# Patient Record
Sex: MALE | Race: WHITE | NOT HISPANIC OR LATINO | Employment: FULL TIME | URBAN - NONMETROPOLITAN AREA
[De-identification: names, ages, dates, MRNs, and addresses within clinical notes are randomized per-mention and may not be internally consistent; named-entity substitution may affect disease eponyms.]

---

## 2018-08-02 ENCOUNTER — OFFICE VISIT CONVERTED (OUTPATIENT)
Dept: FAMILY MEDICINE CLINIC | Age: 38
End: 2018-08-02
Attending: NURSE PRACTITIONER

## 2020-07-10 ENCOUNTER — CONVERSION ENCOUNTER (OUTPATIENT)
Dept: FAMILY MEDICINE CLINIC | Age: 40
End: 2020-07-10

## 2020-07-10 ENCOUNTER — HOSPITAL ENCOUNTER (OUTPATIENT)
Dept: OTHER | Facility: HOSPITAL | Age: 40
Discharge: HOME | End: 2020-07-10
Attending: NURSE PRACTITIONER

## 2020-07-14 LAB — SARS-COV-2 RNA SPEC QL NAA+PROBE: NOT DETECTED

## 2020-11-11 ENCOUNTER — HOSPITAL ENCOUNTER (OUTPATIENT)
Dept: OTHER | Facility: HOSPITAL | Age: 40
Discharge: HOME | End: 2020-11-11
Attending: FAMILY MEDICINE

## 2020-11-14 LAB — SARS-COV-2 RNA SPEC QL NAA+PROBE: NOT DETECTED

## 2021-01-20 ENCOUNTER — OFFICE VISIT CONVERTED (OUTPATIENT)
Dept: FAMILY MEDICINE CLINIC | Age: 41
End: 2021-01-20
Attending: NURSE PRACTITIONER

## 2021-03-09 ENCOUNTER — HOSPITAL ENCOUNTER (OUTPATIENT)
Dept: OTHER | Facility: HOSPITAL | Age: 41
Discharge: HOME | End: 2021-03-09
Attending: SURGERY

## 2021-03-09 ENCOUNTER — OFFICE VISIT CONVERTED (OUTPATIENT)
Dept: SURGERY | Facility: CLINIC | Age: 41
End: 2021-03-09
Attending: SURGERY

## 2021-03-10 LAB — SARS-COV-2 RNA SPEC QL NAA+PROBE: NOT DETECTED

## 2021-03-12 ENCOUNTER — HOSPITAL ENCOUNTER (OUTPATIENT)
Dept: PERIOP | Facility: HOSPITAL | Age: 41
Setting detail: HOSPITAL OUTPATIENT SURGERY
Discharge: HOME | End: 2021-03-12
Attending: SURGERY

## 2021-03-23 ENCOUNTER — OFFICE VISIT CONVERTED (OUTPATIENT)
Dept: SURGERY | Facility: CLINIC | Age: 41
End: 2021-03-23
Attending: SURGERY

## 2021-05-10 NOTE — H&P
History and Physical      Patient Name: David Coles   Patient ID: [id number]   Sex: Male   YOB: 1980    Primary Care Provider: Bailey IVORY   Referring Provider: Bailey IVORY    Visit Date: March 9, 2021    Provider: Tomas Earl MD   Location: [facility] [place]   Location Address: [address] [place], [place] [address]  [address] [place] [address]  [id number]   Location Phone: [phone]          Chief Complaint  · Hemorrhoids      History Of Present Illness  David Coles is a 40 year old /White male who presents to the office today as a consult from Bailey IVORY.      History of tobacco abuse, hypertension    He has a 6-month history of worsening pain to his anus with occasional razor blade type pain with bowel movements as well as sitting on his anus.  Occasional painless hematochezia on the toilet paper.  No significant spasm after a bowel movement.  No prolapse.  No thrombosed hemorrhoid.  No relief with Preparation H.  No weight loss.  No family history of colorectal cancer.  No previous colonoscopy.  No change in bowel habits.  No fiber or laxative use.  No purulent drainage from anus.  No history of abscess or fistula.       Past Medical History  Disease Name Date Onset Notes   Hemorrhoids --  --    HTN (hypertension) --  --          Past Surgical History  Procedure Name Date Notes   *Denies any surgical procedures --  --    *No Past Surgical History --  --          Medication List  Name Date Started Instructions   lisinopril 10 mg oral tablet  take 1 tablet (10 mg) by oral route once daily         Allergy List  Allergen Name Date Reaction Notes   NO KNOWN DRUG ALLERGIES --  --  --          Family Medical History  Disease Name Relative/Age Notes   *No Known Family History  --          Social History  Finding Status Start/Stop Quantity Notes   Tobacco Current every day --/-- 1 pack --          Review of Systems  · Constitutional  o Denies  o :  chills, fever  · Eyes  o Denies  o : yellowish discoloration of eyes  · HENT  o Denies  o : nose bleeding, difficulty swallowing  · Cardiovascular  o Denies  o : chest pain on exertion  · Respiratory  o Denies  o : shortness of breath  · Gastrointestinal  o Denies  o : nausea, constipation  · Genitourinary  o Denies  o : abnormal color of urine, frequency of urine  · Integument  o Denies  o : rash, skin lesion or lump  · Neurologic  o Denies  o : tingling or numbness  · Musculoskeletal  o Denies  o : joint pain  · Endocrine  o Denies  o : weight gain, weight loss  · Psychiatric  o Denies  o : anxiety, delusions      Vitals  Date Time BP Position Site L\R Cuff Size HR RR TEMP (F) WT  HT  BMI kg/m2 BSA m2 O2 Sat FR L/min FiO2 HC       03/09/2021 01:39 PM       16 97.5 201lbs 8oz 6'   27.33 2.15             Physical Examination  · Constitutional  o Appearance  o : healthy appearing, alert and in no acute distress, reliable historian  · Head and Face  o Head  o :   § Inspection  § : no visable deformities or lesions  · Eyes  o Conjunctivae  o : clear  o Sclerae  o : clear  · Neck  o Inspection/Palpation  o : normal appearance, no masses, trachea midline  · Respiratory  o Respiratory Effort  o : breathing unlabored, respiratory effort appears normal  o Inspection of Chest  o : normal appearance, no retractions  · Cardiovascular  o Heart  o : regular rate and rhythm  · Gastrointestinal  o Abdominal Examination  o :   § Abdomen  § : soft, nontender, nondistended  · Genitourinary  o Anus  o : No thrombosed hemorrhoid. External os left posterior with some tracking seen. Not consistent with fissure. Rectal exam not performed secondary to discomfort.  · Skin and Subcutaneous Tissue  o General Inspection  o : no visible concerning rashes or lesions present  · Neurologic  o Cranial Nerves  o : no obvious motor deficits  o Sensation  o : no obvious sensory deficits  o Gait and Station  o :   § Gait Screening  § : normal gait,  able to stand without diffculty  o Cerebellar Function  o : no obvious abnormalities  · Psychiatric  o Judgement and Insight  o : judgment and insight intact  o Mood and Affect  o : mood normal, affect appropriate          Assessment  · Pre-Surgical Orders     V72.84  · Anal fistula     565.1/K60.3      Plan  · Orders  o GENERAL SURGERY (GNSUR) - V72.84 - 03/09/2021  o [facility] Pre-Op Covid-19 Screening (38749) - - 03/09/2021  · Medications  o Medications have been Reconciled  o Transition of Care or Provider Policy  · Instructions  o PLEASE SIGN PERMIT FOR: Exam under anesthesia with any indicated procedures  o Consult Anesthesia for any post-operative block, or any pain management procedure deemed necessary by the anesthesiologist for adequate post-operative pain control.  o Anesthesia: General, prone, order my pain cocktail, no Trimble, no enema, no bowel prep  o Outpatient  o O.R. PREP: Per protocol  o IV: Per Anesthesia  o SCD's preoperatively  o *__Cefazolin 2 gram IV on call to OR.  o Consent for surgery: Given these options, the patient has verbally expressed an understanding of the risks of surgery and finds these risks acceptable. We will proceed with surgery as soon as possible.  o The indications, options, risks, benefits, and expected outcomes of the planned procedure were discussed with the patient and the patient agrees to proceed.   o Electronically Identified Patient Education Materials Provided Electronically     I had a discussion with the patient.  I explained what I saw on physical exam and that he may have a fistula causing the pain.  I recommended exam under anesthesia with any indicated procedures including possible fistulotomy, partial lateral internal anal sphincterotomy, seton placement.  Benefits and alternatives discussed.  Risk of procedure including risk of anesthesia, bleeding, infection, need for more surgery, change in continence, pain.  All questions answered.  He agrees with the  plan.    Smoking cessation performed.             Electronically Signed by: Tomas Earl MD -Author on March 9, 2021 01:59:45 PM

## 2021-05-10 NOTE — H&P
History and Physical      Patient Name: David Coles   Patient ID: [id number]   Sex: Male   YOB: 1980    Primary Care Provider: Bailey IVORY   Referring Provider: Bailey IVORY    Visit Date: March 23, 2021    Provider: Tomas Earl MD   Location: [facility] [place]   Location Address: [address] [place] [address] [facility]  [address] [place] [address]  [id number]   Location Phone: [phone]          Chief Complaint  · Outpatient History & Physical / Surgical Orders  · Colon Consult  · Follow up Surgery      History Of Present Illness  David Coles is a 40 year old /White male who presents to the office today as a consult from Bailey IVORY.      History of anal fissure status post partial lateral internal anal sphincterotomy    He comes in for follow-up.  He is doing well.  His pain is resolved from the fissure.  He is pleased with his progress.  He is using fiber.       Past Medical History  Disease Name Date Onset Notes   Hemorrhoids --  --    HTN (hypertension) --  --          Past Surgical History  Procedure Name Date Notes   *Denies any surgical procedures --  --    *No Past Surgical History --  --          Medication List  Name Date Started Instructions   lisinopril 10 mg oral tablet  take 1 tablet (10 mg) by oral route once daily         Allergy List  Allergen Name Date Reaction Notes   NO KNOWN DRUG ALLERGIES --  --  --          Family Medical History  Disease Name Relative/Age Notes   *No Known Family History  --          Social History  Finding Status Start/Stop Quantity Notes   Tobacco Current every day --/-- 1 pack --          Review of Systems  · Constitutional  o Denies  o : chills, fever  · Gastrointestinal  o Denies  o : nausea, vomiting      Vitals  Date Time BP Position Site L\R Cuff Size HR RR TEMP (F) WT  HT  BMI kg/m2 BSA m2 O2 Sat FR L/min FiO2 HC       03/23/2021 12:32 PM       16 97.4 203lbs 6oz 6'   27.58 2.16             Physical  Examination  · Constitutional  o Appearance  o : healthy appearing, alert and in no acute distress, reliable historian  · Head and Face  o Head  o :   § Inspection  § : no visable deformities or lesions  · Eyes  o Conjunctivae  o : clear  o Sclerae  o : clear  · Neck  o Inspection/Palpation  o : normal appearance, no masses, trachea midline  · Respiratory  o Respiratory Effort  o : breathing unlabored, respiratory effort appears normal  o Inspection of Chest  o : normal appearance, no retractions  · Cardiovascular  o Heart  o : regular rate and rhythm  · Gastrointestinal  o Abdominal Examination  o :   § Abdomen  § : soft, nondistended  · Skin and Subcutaneous Tissue  o General Inspection  o : no visible concerning rashes or lesions present  · Neurologic  o Cranial Nerves  o : no obvious motor deficits  o Sensation  o : no obvious sensory deficits  o Gait and Station  o :   § Gait Screening  § : normal gait, able to stand without diffculty  o Cerebellar Function  o : no obvious abnormalities  · Psychiatric  o Judgement and Insight  o : judgment and insight intact  o Mood and Affect  o : mood normal, affect appropriate          Assessment  · Pre-Surgical Orders     V72.84  · Rectal Bleeding     569.3/K62.5      Plan  · Orders  o Colonoscopy (73165) - V72.84 - 06/24/2021  · Medications  o Medications have been Reconciled  o Transition of Care or Provider Policy  · Instructions  o PLAN: Colonoscopy  o Outpatient  o Consent for surgery: Given these options, the patient has verbally expressed an understanding of the risks of surgery and finds these risks acceptable. We will proceed with surgery as soon as possible.  o The indications, options, risks, benefits, and expected outcomes of the planned procedure were discussed with the patient and the patient agrees to proceed.   o IV: LR@ 30 ml/hr  o Anesthesia: MAC  o PLEASE SIGN PERMIT FOR: Colonscopy with possible biopsy and possible polypectomy, possible rubber band  ligation of internal hemorrhoids  o Electronically Identified Patient Education Materials Provided Electronically     I instructed him to continue fiber.  I recommended colonoscopy with possible rubber band ligation in 3 months due to his history of rectal bleeding.  Benefits and alternatives discussed.  Risk of procedure including bleeding, perforation, pain, infection were discussed.  All questions answered.  He agrees with the plan.  Thank you for this consult.             Electronically Signed by: Tomas Earl MD -Author on March 23, 2021 12:43:26 PM

## 2021-05-14 VITALS — HEIGHT: 72 IN | BODY MASS INDEX: 27.29 KG/M2 | WEIGHT: 201.5 LBS | RESPIRATION RATE: 16 BRPM | TEMPERATURE: 97.5 F

## 2021-05-14 VITALS — WEIGHT: 203.37 LBS | RESPIRATION RATE: 16 BRPM | TEMPERATURE: 97.4 F | BODY MASS INDEX: 27.55 KG/M2 | HEIGHT: 72 IN

## 2021-05-18 NOTE — PROGRESS NOTES
Sanket David R. 1980     Office/Outpatient Visit    Visit Date: Thu, Aug 2, 2018 09:52 am    Provider: Bailey Ricardo N.P. (Assistant: Sharmila Del Toro)    Location: [facility]        Electronically signed by Bailey Ricardo N.P. on  08/02/2018 10:32:51 AM                             SUBJECTIVE:        CC:     Mr. Coles is a 37 year old White male.  stomach pain x2 days, blood in stool         HPI:         Mr. Coles presents with hypertension.  His current cardiac medication regimen includes an ACE inhibitor ( Lisinopril ).  Mr. Coles does not check his blood pressure other than at his clinic appointments.  Compliance with treatment has been poor; he Has not taken BP medications for about 6 months..          Additionally, he presents with history of abdominal pain, other specified site.  this is located primarily in the epigastric region.  It began 2 months ago.  The pain is relieved with Tums.  Associated symptoms include red bloody stools.  He denies constipation, diarrhea, fever, nausea or vomiting.      ROS:     CONSTITUTIONAL:  Negative for chills and fever.      CARDIOVASCULAR:  Negative for chest pain and palpitations.      RESPIRATORY:  Negative for dyspnea and frequent wheezing.      GASTROINTESTINAL:  Positive for abdominal pain and bright red blood in stool yesterday.   Negative for constipation, diarrhea, nausea or vomiting.          PMH/FMH/SH:     Last Reviewed on 8/02/2018 10:10 AM by Bailey Ricardo    Past Medical History:             PAST MEDICAL HISTORY         Positive for    Hypertension: uncontrolled; ;         Surgical History:     NONE         Family History:     Father: Healthy     Mother: Healthy         Social History:     Occupation: [employer]     Marital Status:      Children: 1 child         Tobacco/Alcohol/Supplements:     Last Reviewed on 8/02/2018 10:10 AM by Bailey Ricardo    Tobacco: Current Smoker: He currently smokes every day, 1 pack per day; 1-1/2 packs per  day; (started smoking at age 16 pack-year history).          Alcohol: When he drinks, the average quantity of alcohol is frequency weekly.   He typically consumes beer.  drinks beer some during the week         Substance Abuse History:     Last Reviewed on 8/02/2018 10:10 AM by Bailey Ricardo        Mental Health History:     Last Reviewed on 8/02/2018 10:10 AM by Bailey Ricardo        Communicable Diseases (eg STDs):     Last Reviewed on 8/02/2018 10:10 AM by Bailey Ricardo            Current Problems:     Last Reviewed on 8/02/2018 10:10 AM by Bailey Ricardo    Hypertension     High blood pressure     Cigarette smoking         Immunizations:     None        Allergies:     Last Reviewed on 8/02/2018 10:10 AM by Bailey Ricardo      No Known Drug Allergies.         Current Medications:     Last Reviewed on 8/02/2018 10:10 AM by Bailey Ricardo    Lisinopril 10mg Tablet Take 1 tablet(s) by mouth daily         OBJECTIVE:        Vitals:         Current: 8/2/2018 9:54:54 AM    Ht:  6 ft, 0 in;  Wt: 194.8 lbs;  BMI: 26.4    T: 97.4 F (oral);  BP: 150/94 mm Hg (left arm, sitting);  P: 65 bpm (left arm (BP Cuff), sitting);  sCr: 1.04 mg/dL;  GFR: 98.82        Repeat:     10:01:36 AM     BP:   136/92mm Hg (left arm, sitting)         Exams:     PHYSICAL EXAM:     GENERAL: well developed, well nourished;  no apparent distress;     RESPIRATORY: normal respiratory rate and pattern with no distress; normal breath sounds with no rales, rhonchi, wheezes or rubs;     CARDIOVASCULAR: normal rate; rhythm is regular;     GASTROINTESTINAL: nontender; normal bowel sounds; no organomegaly; rectal exam: normal tone; external hemorrhoid(s) present;     MUSCULOSKELETAL: normal gait;     NEUROLOGIC: mental status: alert and oriented x 3; GROSSLY INTACT     PSYCHIATRIC: appropriate affect and demeanor;         ASSESSMENT           401.1   I10  Hypertension              DDx:     789.09   R10.33  Abdominal pain, other specified site              DDx:      455.6   K64.9  Hemorrhoids, NOS              DDx:     305.1   F17.200  Cigarette smoking              DDx:         ORDERS:         Meds Prescribed:       Refill of: Lisinopril 10mg Tablet Take 1 tablet(s) by mouth daily  #90 (Ninety) tablet(s) Refills: 1       Analpram-HC (Hydrocortisone Acetate/Pramoxine ) 2.5%/1% Rectal Cream Apply twice per day, prn  #30 (Thirty) gm Refills: 0         Lab Orders:       28494  AMYS - [facility] Amylase, Serum  (Send-Out)         98108  BDCB2 - [facility] CBC w/o diff  (Send-Out)         65348  COMP - [facility] Comp. Metabolic Panel  (Send-Out)         59699  HPUBT - [facility] H.pylori Breath test  (Send-Out)         95570  LIP - [facility] Lipase, Serum  (Send-Out)         80081  LPDP - [facility] Lipid Panel  (Send-Out)         APPTO  Appointment need  (In-House)           Other Orders:       4004F  Pt scrnd tobacco use rcvd tobacco cessation talk  (In-House)                   PLAN:          Hypertension     LABORATORY:  Labs ordered to be performed today include lipid panel.      RECOMMENDATIONS given include: keep blood pressure log as directed and eat a low salt diet.      FOLLOW-UP: Schedule a follow-up visit in 6 months.            Prescriptions:       Refill of: Lisinopril 10mg Tablet Take 1 tablet(s) by mouth daily  #90 (Ninety) tablet(s) Refills: 1           Orders:       95515  LPDP - [facility] Lipid Panel  (Send-Out)         APPTO  Appointment need  (In-House)            Abdominal pain, other specified site     LABORATORY:  Labs ordered to be performed today include amylase, CBC W/O DIFF, Comprehensive metabolic panel, H. pylori breath test, and Lipase.      RECOMMENDATIONS given include: Further recommendation to be given after test results are complete.      FOLLOW-UP:.   for after labs           Orders:       38240  AMYS - [facility] Amylase, Serum  (Send-Out)         70366  BDCB2 - [facility] CBC w/o diff  (Send-Out)         96381  COMP - [facility] Comp. Metabolic Panel  (Send-Out)         07147  HPUBT - [facility] H.pylori Breath test   (Send-Out)         24438  LIP - [facility] Lipase, Serum  (Send-Out)            Hemorrhoids, NOS Increase water intake and fiber intake. To ER for worsening symptoms such as black tarry stools, weakness, or vomiting blood.           Prescriptions:       Analpram-HC (Hydrocortisone Acetate/Pramoxine ) 2.5%/1% Rectal Cream Apply twice per day, prn  #30 (Thirty) gm Refills: 0          Cigarette smoking         RECOMMENDATIONS given include: Counseled on smoking cessation and advised of the benefits to patient's health if he were to stop smoking. Counseling for less than 3 minutes.            Orders:       4004F  Pt scrnd tobacco use rcvd tobacco cessation talk  (In-House)               Patient Recommendations:        For  Hypertension:     Keep a daily blood pressure log and report elevated blood pressure to provider as directed.  Schedule a follow-up visit in 6 months.          For  Abdominal pain, other specified site:                     APPOINTMENT INFORMATION:        Monday Tuesday Wednesday Thursday Friday Saturday Sunday            Time:___________________AM  PM   Date:_____________________         For  Cigarette smoking:         Stop smoking.              CHARGE CAPTURE           **Please note: ICD descriptions below are intended for billing purposes only and may not represent clinical diagnoses**        Primary Diagnosis:         401.1 Hypertension            I10    Essential (primary) hypertension              Orders:          27182   Office/outpatient visit; established patient, level 4  (In-House)             APPTO   Appointment need  (In-House)           789.09 Abdominal pain, other specified site            R10.33    Periumbilical pain    455.6 Hemorrhoids, NOS            K64.9    Unspecified hemorrhoids    305.1 Cigarette smoking            F17.200    Nicotine dependence, unspecified, uncomplicated              Orders:          4004F   Pt scrnd tobacco use rcvd tobacco cessation talk  (In-House)

## 2021-05-18 NOTE — PROGRESS NOTES
David Coles  1980     Office/Outpatient Visit    Visit Date: Wed, Jan 20, 2021 01:59 pm    Provider: Bailey Ricardo N.P. (Assistant: Leticia Jacques RN)    Location: [facility]        Electronically signed by Bailey Ricardo N.P. on  01/20/2021 02:40:53 PM                             Subjective:        CC: Chris is a 40 year old White male.  Follow up (has not taking lisinopril in a year)         HPI:           Patient to be evaluated for essential (primary) hypertension.  He is not currently taking an antihypertensive.  Was previously on Lisinopril 10 mg daily. Has not taken for at least one year.  He is willing to restart. Denies CP, SOA, palpitations.      David also would like to be evaluated for hemorrhoids today. He has history of hemorrhoids. For the past few days he has felt a knot in rectal area and had pain. Some rectal bleeding but not as bad as with previous hemorrhoids. Using preparation H. Has been working overtime doing heavy lifting until the past few days so thinks that may have contributed. Previously used Analpram which helped. Has never had hemorroidectomy.    ROS:     CONSTITUTIONAL:  Negative for chills and fever.      CARDIOVASCULAR:  Negative for chest pain and palpitations.      RESPIRATORY:  Negative for dyspnea and frequent wheezing.      GASTROINTESTINAL:  Positive for hemorrhoids.   Negative for abdominal pain or vomiting.      NEUROLOGICAL:  Negative for dizziness and headaches.      PSYCHIATRIC:  Negative for depression and suicidal thoughts.          Past Medical History / Family History / Social History:         Last Reviewed on 1/20/2021 02:17 PM by Bailey Ricardo    Past Medical History:             PAST MEDICAL HISTORY         Positive for    Hypertension: uncontrolled; ;         Surgical History:     NONE         Family History:     Father: Healthy     Mother: Healthy         Social History:     Occupation: [employer]     Marital Status:       Children: 1 child         Tobacco/Alcohol/Supplements:     Last Reviewed on 1/20/2021 02:00 PM by Leticia Jacques    Tobacco: Current Smoker: He currently smokes every day, 1 pack per day; 1-1/2 packs per day; (started smoking at age 16 pack-year history).          Alcohol: Frequency: Once a week When he drinks, the average quantity of alcohol is 1-2 drinks.   He typically consumes beer.          Substance Abuse History:     Last Reviewed on 8/02/2018 10:10 AM by Bailey Ricardo        Mental Health History:     Last Reviewed on 8/02/2018 10:10 AM by Bailey Ricardo        Communicable Diseases (eg STDs):     Last Reviewed on 8/02/2018 10:10 AM by Bailey Ricardo        Current Problems:     Last Reviewed on 8/02/2018 10:10 AM by Bailey Ricardo    Nicotine dependence, unspecified, uncomplicated    Essential (primary) hypertension    Unspecified hemorrhoids    Contact with and (suspected) exposure to other viral communicable diseases        Immunizations:     None        Allergies:     Last Reviewed on 1/20/2021 02:00 PM by Leticia Jacques    No Known Allergies.        Current Medications:     Last Reviewed on 1/20/2021 02:00 PM by Leticia Jacques    Lisinopril 10mg Tablet [Take 1 tablet(s) by mouth daily]        Objective:        Vitals:         Historical:     8/2/2018  BP:   136/92 mm Hg ( (left arm, , sitting, );) 8/2/2018  P:   65bpm ( (left arm (BP Cuff), , sitting, );) 8/2/2018  Wt:   194.8lbs    Current: 1/20/2021 2:03:43 PM    Ht:  6 ft, 0 in;  Wt: 203.6 lbs;  BMI: 27.6T: 98.5 F (oral);  BP: 147/94 mm Hg (left arm, sitting);  P: 79 bpm (left arm (BP Cuff), sitting);  sCr: 1.11 mg/dL;  GFR: 91.66        Repeat:     2:4:38 PM  BP:   141/92mm Hg (left arm, sitting, pulse-83)     Exams:     PHYSICAL EXAM:     GENERAL: well developed, well nourished;  no apparent distress;     RESPIRATORY: normal respiratory rate and pattern with no distress; normal breath sounds with no rales, rhonchi, wheezes or rubs;     CARDIOVASCULAR:  normal rate; rhythm is regular;     GASTROINTESTINAL: nontender; normal bowel sounds; no organomegaly; rectal exam: normal tone; external hemorrhoid(s) present;     GENITOURINARY: penis: no lesions or urethral discharge;  no testicular tenderness or masses; no inguinal hernia; normal scrotum without lesions or rashes     MUSCULOSKELETAL: normal gait;     NEUROLOGIC: mental status: alert and oriented x 3; GROSSLY INTACT     PSYCHIATRIC: appropriate affect and demeanor; Declines having a chaperone present during exam.          Assessment:         I10   Essential (primary) hypertension       F17.200   Nicotine dependence, unspecified, uncomplicated       K64.9   Unspecified hemorrhoids       Z13.31   Encounter for screening for depression           ORDERS:         Meds Prescribed:       [Refilled] Lisinopril 10 mg oral tablet [Take 1 tablet(s) by mouth daily], #90 (ninety) tablets, Refills: 0 (zero)       [Refilled] Analpram-HC 2.5-1 % Rectal Cream [Apply twice per day, prn], #30 (thirty) grams, Refills: 0 (zero)         Lab Orders:       58109  [facility] Comp. Metabolic Panel  (Send-Out)            30453  [facility] Lipid Panel  (Send-Out)            APPTO  Appointment need  (In-House)              Other Orders:       4004F  Pt scrnd tobacco use rcvd tobacco cessation talk  (In-House)              Depression screen negative  (In-House)                      Plan:         Essential (primary) hypertensionLow sodium diet. Work on alcohol reduction. Will restart Lisinopril. Will return for fasting labs in 6-10 weeks.    LABORATORY:  Labs ordered to be performed today include Comprehensive metabolic panel and lipid panel.      FOLLOW-UP: Schedule a follow-up visit in 3 months.            Prescriptions:       [Refilled] Lisinopril 10 mg oral tablet [Take 1 tablet(s) by mouth daily], #90 (ninety) tablets, Refills: 0 (zero)           Orders:       55392  [facility] Comp. Metabolic Panel  (Send-Out)            56670  [facility] Lipid Panel  (Send-Out)            APPTO  Appointment need  (In-House)              Nicotine dependence, unspecified, uncomplicated        RECOMMENDATIONS given include: Counseled on smoking cessation and advised of the benefits to patient's health if he were to stop smoking. Counseling for less than 3 minutes.            Orders:       4004F  Pt scrnd tobacco use rcvd tobacco cessation talk  (In-House)              Unspecified hemorrhoidsSitz baths. Off work to rest. No heavy lifting. Discussed may need referral to gen surg/GI if no improvement. Will call if no improvement.          Prescriptions:       [Refilled] Analpram-HC 2.5-1 % Rectal Cream [Apply twice per day, prn], #30 (thirty) grams, Refills: 0 (zero)         Encounter for screening for depression    MIPS PHQ-9 Depression Screening: Completed form scanned and in chart; Total Score 9; Positive Depression Screen but after further evaluation the patient does not have a diagnosis of depression.            Orders:         Depression screen negative  (In-House)                  Patient Recommendations:        For  Essential (primary) hypertension:    Schedule a follow-up visit in 3 months.          For  Nicotine dependence, unspecified, uncomplicated:        Stop smoking.              Charge Capture:         Primary Diagnosis:     I10  Essential (primary) hypertension           Orders:      23627  Office/outpatient visit; established patient, level 4  (In-House)            APPTO  Appointment need  (In-House)              F17.200  Nicotine dependence, unspecified, uncomplicated           Orders:      4004F  Pt scrnd tobacco use rcvd tobacco cessation talk  (In-House)              K64.9  Unspecified hemorrhoids     Z13.31  Encounter for screening for depression           Orders:        Depression screen negative  (In-House)

## 2021-06-18 DIAGNOSIS — I10 ESSENTIAL HYPERTENSION: Primary | ICD-10-CM

## 2021-06-18 RX ORDER — LISINOPRIL 10 MG/1
1 TABLET ORAL DAILY
COMMUNITY
End: 2021-06-18 | Stop reason: SDUPTHER

## 2021-06-18 RX ORDER — LISINOPRIL 10 MG/1
10 TABLET ORAL DAILY
Qty: 30 TABLET | Refills: 0 | Status: SHIPPED | OUTPATIENT
Start: 2021-06-18 | End: 2022-05-31 | Stop reason: SDDI

## 2021-06-18 NOTE — TELEPHONE ENCOUNTER
Caller: David Coles    Relationship: Self    Best call back number: [phone]     Medication needed: LISINIPRIL    When do you need the refill by: 06/18/21    What additional details did the patient provide when requesting the medication:     Does the patient have less than a 3 day supply:  [x] Yes  [] No    What is the patient's preferred pharmacy: [facility] [address] [place] [address], [place] [address]  MARIE SULTANA  - [phone] [facility] [phone] FX

## 2021-07-01 VITALS
HEART RATE: 65 BPM | WEIGHT: 194.8 LBS | TEMPERATURE: 97.4 F | BODY MASS INDEX: 26.38 KG/M2 | SYSTOLIC BLOOD PRESSURE: 136 MMHG | HEIGHT: 72 IN | DIASTOLIC BLOOD PRESSURE: 92 MMHG

## 2021-07-02 VITALS
SYSTOLIC BLOOD PRESSURE: 141 MMHG | TEMPERATURE: 98.5 F | DIASTOLIC BLOOD PRESSURE: 92 MMHG | HEART RATE: 79 BPM | BODY MASS INDEX: 27.58 KG/M2 | WEIGHT: 203.6 LBS | HEIGHT: 72 IN

## 2021-07-02 VITALS — HEIGHT: 72 IN | TEMPERATURE: 97.3 F | BODY MASS INDEX: 26.42 KG/M2

## 2022-05-31 ENCOUNTER — OFFICE VISIT (OUTPATIENT)
Dept: FAMILY MEDICINE CLINIC | Age: 42
End: 2022-05-31

## 2022-05-31 ENCOUNTER — LAB (OUTPATIENT)
Dept: LAB | Facility: HOSPITAL | Age: 42
End: 2022-05-31

## 2022-05-31 VITALS
HEART RATE: 85 BPM | TEMPERATURE: 98.8 F | WEIGHT: 210 LBS | DIASTOLIC BLOOD PRESSURE: 89 MMHG | BODY MASS INDEX: 28.48 KG/M2 | SYSTOLIC BLOOD PRESSURE: 147 MMHG

## 2022-05-31 DIAGNOSIS — N39.0 URINARY TRACT INFECTION WITHOUT HEMATURIA, SITE UNSPECIFIED: Primary | ICD-10-CM

## 2022-05-31 DIAGNOSIS — R10.84 GENERALIZED ABDOMINAL PAIN: ICD-10-CM

## 2022-05-31 DIAGNOSIS — I10 ESSENTIAL HYPERTENSION: Primary | ICD-10-CM

## 2022-05-31 DIAGNOSIS — N39.0 URINARY TRACT INFECTION WITHOUT HEMATURIA, SITE UNSPECIFIED: ICD-10-CM

## 2022-05-31 DIAGNOSIS — I10 ESSENTIAL HYPERTENSION: ICD-10-CM

## 2022-05-31 LAB
BACTERIA UR QL AUTO: ABNORMAL /HPF
BASOPHILS # BLD AUTO: 0.02 10*3/MM3 (ref 0–0.2)
BASOPHILS NFR BLD AUTO: 0.3 % (ref 0–1.5)
BILIRUB UR QL STRIP: NEGATIVE
CLARITY UR: CLEAR
COLOR UR: YELLOW
DEPRECATED RDW RBC AUTO: 40.4 FL (ref 37–54)
EOSINOPHIL # BLD AUTO: 0.1 10*3/MM3 (ref 0–0.4)
EOSINOPHIL NFR BLD AUTO: 1.3 % (ref 0.3–6.2)
ERYTHROCYTE [DISTWIDTH] IN BLOOD BY AUTOMATED COUNT: 11.9 % (ref 12.3–15.4)
GLUCOSE UR STRIP-MCNC: NEGATIVE MG/DL
HCT VFR BLD AUTO: 44.3 % (ref 37.5–51)
HGB BLD-MCNC: 14.8 G/DL (ref 13–17.7)
HGB UR QL STRIP.AUTO: ABNORMAL
IMM GRANULOCYTES # BLD AUTO: 0.02 10*3/MM3 (ref 0–0.05)
IMM GRANULOCYTES NFR BLD AUTO: 0.3 % (ref 0–0.5)
KETONES UR QL STRIP: NEGATIVE
LEUKOCYTE ESTERASE UR QL STRIP.AUTO: NEGATIVE
LYMPHOCYTES # BLD AUTO: 2.96 10*3/MM3 (ref 0.7–3.1)
LYMPHOCYTES NFR BLD AUTO: 37 % (ref 19.6–45.3)
MCH RBC QN AUTO: 30.3 PG (ref 26.6–33)
MCHC RBC AUTO-ENTMCNC: 33.4 G/DL (ref 31.5–35.7)
MCV RBC AUTO: 90.8 FL (ref 79–97)
MONOCYTES # BLD AUTO: 0.43 10*3/MM3 (ref 0.1–0.9)
MONOCYTES NFR BLD AUTO: 5.4 % (ref 5–12)
MUCOUS THREADS URNS QL MICRO: ABNORMAL /HPF
NEUTROPHILS NFR BLD AUTO: 4.46 10*3/MM3 (ref 1.7–7)
NEUTROPHILS NFR BLD AUTO: 55.7 % (ref 42.7–76)
NITRITE UR QL STRIP: NEGATIVE
PH UR STRIP.AUTO: 6 [PH] (ref 5–8)
PLATELET # BLD AUTO: 224 10*3/MM3 (ref 140–450)
PMV BLD AUTO: 10.2 FL (ref 6–12)
PROT UR QL STRIP: NEGATIVE
RBC # BLD AUTO: 4.88 10*6/MM3 (ref 4.14–5.8)
RBC # UR STRIP: ABNORMAL /HPF
REF LAB TEST METHOD: ABNORMAL
SP GR UR STRIP: >=1.03 (ref 1–1.03)
SQUAMOUS #/AREA URNS HPF: ABNORMAL /HPF
UROBILINOGEN UR QL STRIP: ABNORMAL
WBC # UR STRIP: ABNORMAL /HPF
WBC NRBC COR # BLD: 7.99 10*3/MM3 (ref 3.4–10.8)

## 2022-05-31 PROCEDURE — 36415 COLL VENOUS BLD VENIPUNCTURE: CPT

## 2022-05-31 PROCEDURE — 99214 OFFICE O/P EST MOD 30 MIN: CPT | Performed by: NURSE PRACTITIONER

## 2022-05-31 PROCEDURE — 80053 COMPREHEN METABOLIC PANEL: CPT

## 2022-05-31 PROCEDURE — 81001 URINALYSIS AUTO W/SCOPE: CPT | Performed by: NURSE PRACTITIONER

## 2022-05-31 PROCEDURE — 82150 ASSAY OF AMYLASE: CPT

## 2022-05-31 PROCEDURE — 87086 URINE CULTURE/COLONY COUNT: CPT | Performed by: NURSE PRACTITIONER

## 2022-05-31 PROCEDURE — 85025 COMPLETE CBC W/AUTO DIFF WBC: CPT

## 2022-05-31 NOTE — PROGRESS NOTES
David Coles presents to [facility].    Chief Complaint:  Flank Pain ((R) about a week )         History of Present Illness:  Side pain  Symptoms started one week ago or so ago  Associated symptoms: palpable area to right abd/side, no change,  occ abd pain   Treatment tried:none   Fell coon hunting, bruise on abd, right side 2-3 weeks ago    PAST MEDICAL HISTORY         Positive for    Hypertension: ran out of rx         Surgical History:     Sphincterotomy: 3- Dr Tomas Earl : posterior midline fissure, no fistula        Family History:     Father: Healthy     Mother: Healthy     Brother: 1 healthy    Sister: 1 healthy    Daughter: 1 healthy      Social History:     Occupation: [employer]     Marital Status:      Children: 1 child           Review of Systems:  Review of Systems   Constitutional: Negative for fatigue and fever.   Respiratory: Negative for cough and shortness of breath.    Cardiovascular: Negative for chest pain, palpitations and leg swelling.   Gastrointestinal: Positive for blood in stool (occ ). Negative for constipation and diarrhea.   Genitourinary: Negative for dysuria.   Neurological: Negative for numbness.        No current outpatient medications on file.    Vital Signs:   Vitals:    05/31/22 1043   BP: 147/89   BP Location: Left arm   Patient Position: Sitting   Pulse: 85   Temp: 98.8 °F (37.1 °C)   TempSrc: Oral   Weight: 95.3 kg (210 lb)         Physical Exam:  Physical Exam  Vitals reviewed.   Constitutional:       General: He is not in acute distress.     Appearance: Normal appearance.   Neck:      Vascular: No carotid bruit.   Cardiovascular:      Rate and Rhythm: Normal rate and regular rhythm.      Heart sounds: Normal heart sounds. No murmur heard.  Pulmonary:      Effort: Pulmonary effort is normal. No respiratory distress.      Breath sounds: Normal breath sounds.   Abdominal:      Tenderness: There is no abdominal tenderness.  There is no right CVA tenderness or left CVA tenderness.   Musculoskeletal:      Right lower leg: No edema.      Left lower leg: No edema.   Skin:     Comments: Superficial discrete tiny palpable area to right mid abd   Neurological:      Mental Status: He is alert.   Psychiatric:         Behavior: Behavior normal.      Comments: Anxious          Result Review      The following data was reviewed by: CACHORRO Hanks on 05/31/2022:    Results for orders placed or performed in visit on 05/31/22   Urinalysis With Culture If Indicated - Urine, Clean Catch    Specimen: Urine, Clean Catch   Result Value Ref Range    Color, UA Yellow Yellow, Straw    Appearance, UA Clear Clear    pH, UA 6.0 5.0 - 8.0    Specific Gravity, UA >=1.030 1.005 - 1.030    Glucose, UA Negative Negative    Ketones, UA Negative Negative    Bilirubin, UA Negative Negative    Blood, UA Trace (A) Negative    Protein, UA Negative Negative    Leuk Esterase, UA Negative Negative    Nitrite, UA Negative Negative    Urobilinogen, UA 0.2 E.U./dL 0.2 - 1.0 E.U./dL   Urinalysis, Microscopic Only - Urine, Clean Catch    Specimen: Urine, Clean Catch   Result Value Ref Range    RBC, UA 0-2 (A) None Seen /HPF    WBC, UA 3-5 (A) None Seen /HPF    Bacteria, UA None Seen None Seen /HPF    Squamous Epithelial Cells, UA 0-2 None Seen, 0-2 /HPF    Mucus, UA Trace None Seen, Trace /HPF    Methodology Manual Light Microscopy                Assessment and Plan:          Diagnoses and all orders for this visit:    1. Essential hypertension (Primary)  Assessment & Plan:  Have his bp rechecked, checking labs     Orders:  -     Comprehensive metabolic panel; Future    2. Generalized abdominal pain  Assessment & Plan:  Checking labs, reviewed chart, reviewed op note from Dr Earl for anal surgery in 3-2021     Orders:  -     Amylase; Future  -     CBC w AUTO Differential; Future  -     Comprehensive metabolic panel; Future  -     Urinalysis With Culture If Indicated  - Urine, Clean Catch  -     Urinalysis, Microscopic Only - Urine, Clean Catch        Follow Up   Return for followup pending lab results.  Patient was given instructions and counseling regarding his condition or for health maintenance advice. Please see specific information pulled into the AVS if appropriate.

## 2022-06-01 LAB
ALBUMIN SERPL-MCNC: 4.4 G/DL (ref 3.5–5.2)
ALBUMIN/GLOB SERPL: 1.4 G/DL
ALP SERPL-CCNC: 67 U/L (ref 39–117)
ALT SERPL W P-5'-P-CCNC: 30 U/L (ref 1–41)
AMYLASE SERPL-CCNC: 34 U/L (ref 28–100)
ANION GAP SERPL CALCULATED.3IONS-SCNC: 13.7 MMOL/L (ref 5–15)
AST SERPL-CCNC: 23 U/L (ref 1–40)
BACTERIA SPEC AEROBE CULT: NO GROWTH
BILIRUB SERPL-MCNC: 0.6 MG/DL (ref 0–1.2)
BUN SERPL-MCNC: 13 MG/DL (ref 6–20)
BUN/CREAT SERPL: 11.2 (ref 7–25)
CALCIUM SPEC-SCNC: 9.7 MG/DL (ref 8.6–10.5)
CHLORIDE SERPL-SCNC: 101 MMOL/L (ref 98–107)
CO2 SERPL-SCNC: 25.3 MMOL/L (ref 22–29)
CREAT SERPL-MCNC: 1.16 MG/DL (ref 0.76–1.27)
EGFRCR SERPLBLD CKD-EPI 2021: 81.1 ML/MIN/1.73
GLOBULIN UR ELPH-MCNC: 3.1 GM/DL
GLUCOSE SERPL-MCNC: 88 MG/DL (ref 65–99)
POTASSIUM SERPL-SCNC: 4.2 MMOL/L (ref 3.5–5.2)
PROT SERPL-MCNC: 7.5 G/DL (ref 6–8.5)
SODIUM SERPL-SCNC: 140 MMOL/L (ref 136–145)

## 2022-06-02 ENCOUNTER — TELEPHONE (OUTPATIENT)
Dept: FAMILY MEDICINE CLINIC | Age: 42
End: 2022-06-02

## 2022-06-02 NOTE — TELEPHONE ENCOUNTER
Caller: David Coles    Relationship: Self    Best call back number: [phone]    Who are you requesting to speak with (clinical staff, provider,  specific staff member): KITA    What was the call regarding: PATIENT MISSED A CALL FROM KITA YESTERDAY. ATTEMPTED TO WARM TRANSFER. PLEASE CALL PATIENT BACK WHEN POSSIBLE.

## 2024-04-16 ENCOUNTER — READMISSION MANAGEMENT (OUTPATIENT)
Dept: CALL CENTER | Facility: HOSPITAL | Age: 44
End: 2024-04-16
Payer: COMMERCIAL

## 2024-04-16 ENCOUNTER — TELEPHONE (OUTPATIENT)
Dept: FAMILY MEDICINE CLINIC | Age: 44
End: 2024-04-16
Payer: COMMERCIAL

## 2024-04-16 NOTE — OUTREACH NOTE
Prep Survey      Flowsheet Row Responses   [facility] facility patient discharged from? Non-BH   Is LACE score < 7 ? Non-BH Discharge   Eligibility [facility]   Date of Discharge 04/16/24   Discharge Disposition Home or Self Care   Discharge diagnosis Type I or II open fracture of distal end of left forearm, initial encounter (Primary Dx),  Type I or II open displaced transverse fracture of shaft of left radius, initial encounter,  Type I or II open displaced transverse fracture of shaft of left ulna, initial encounter   Does the patient have one of the following disease processes/diagnoses(primary or secondary)? Other   Prep survey completed? Yes            Calista SINGH - Registered Nurse

## 2024-04-16 NOTE — TELEPHONE ENCOUNTER
Caller: JAMES    Relationship to patient: [facility]    Best call back number: [phone]    New or established patient?  [] New  [x] Established    Date of discharge: 04/16/24    Facility discharged from: [facility]    BROKEN LEFT ARM

## 2024-04-17 ENCOUNTER — TRANSITIONAL CARE MANAGEMENT TELEPHONE ENCOUNTER (OUTPATIENT)
Dept: CALL CENTER | Facility: HOSPITAL | Age: 44
End: 2024-04-17
Payer: COMMERCIAL

## 2024-04-17 NOTE — OUTREACH NOTE
Call Center TCM Note      Flowsheet Row Responses   [facility] patient discharged from? Non-BH  [U/L]   Does the patient have one of the following disease processes/diagnoses(primary or secondary)? Other   TCM attempt successful? Yes  [verbal release from 2022 lists Doretha Coles, wife]   Call start time 0836   Call end time 0845   Discharge diagnosis Type I or II open fracture of distal end of left forearm, initial encounter (Primary Dx),  Type I or II open displaced transverse fracture of shaft of left radius, initial encounter,  Type I or II open displaced transverse fracture of shaft of left ulna, initial encounter   Person spoke with today (if not patient) and relationship kathrine Coyne   Meds reviewed with patient/caregiver? Yes   Is the patient having any side effects they believe may be caused by any medication additions or changes? No   Does the patient have all medications ordered at discharge? Yes   Prescription comments Started on hydrocodone, Calcium, vitamin D, sodium, methocarbamol, and gabapentin   Is the patient taking all medications as directed (includes completed medication regime)? Yes   Comments TCM FOLLOW UP APPOINTMENT IS 4/18/24@430pm   Psychosocial issues? No   Did the patient receive a copy of their discharge instructions? Yes   Nursing interventions Reviewed instructions with patient   What is the patient's perception of their health status since discharge? Same  [Wife reports pt has soft wrap to left arm and can be removed tomorrow, she is to clean daily with soap/water, reviewed insicional instructions with wife and s/s infection.  Pt to avoid lifting for 2 months. Reviewed post op care]   Is the patient/caregiver able to teach back signs and symptoms related to disease process for when to call PCP? Yes   Is the patient/caregiver able to teach back signs and symptoms related to disease process for when to call 911? Yes   Additional teach back comments Pt has had a BM,  no bowel  regimen--encouraged fluid intake, fruits/veggies and to monitor bowel habits.  Encouraged use of IS for one week.  Pt had some swelling to elbow--is propping and icing for relief.   TCM call completed? Yes   Call end time [id number]            Shanika Tinoco RN    4/17/2024, 08:52 EDT

## 2024-04-18 ENCOUNTER — OFFICE VISIT (OUTPATIENT)
Dept: FAMILY MEDICINE CLINIC | Age: 44
End: 2024-04-18
Payer: COMMERCIAL

## 2024-04-18 VITALS
TEMPERATURE: 98.7 F | SYSTOLIC BLOOD PRESSURE: 139 MMHG | WEIGHT: 198.8 LBS | OXYGEN SATURATION: 97 % | HEIGHT: 72 IN | BODY MASS INDEX: 26.93 KG/M2 | DIASTOLIC BLOOD PRESSURE: 90 MMHG | HEART RATE: 86 BPM

## 2024-04-18 DIAGNOSIS — S52.92XB: Primary | ICD-10-CM

## 2024-04-18 DIAGNOSIS — I10 ESSENTIAL HYPERTENSION: ICD-10-CM

## 2024-04-18 DIAGNOSIS — S52.322B: ICD-10-CM

## 2024-04-18 DIAGNOSIS — S52.222B: ICD-10-CM

## 2024-04-18 RX ORDER — HYDROCODONE BITARTRATE AND ACETAMINOPHEN 5; 325 MG/1; MG/1
1 TABLET ORAL EVERY 6 HOURS PRN
COMMUNITY
Start: 2024-04-16 | End: 2024-04-23

## 2024-04-18 RX ORDER — CALCIUM CARBONATE 500(1250)
500 TABLET ORAL DAILY
COMMUNITY
Start: 2024-04-16

## 2024-04-18 RX ORDER — METHOCARBAMOL 750 MG/1
750 TABLET, FILM COATED ORAL 2 TIMES DAILY
COMMUNITY
Start: 2024-04-16 | End: 2024-04-30

## 2024-04-18 RX ORDER — GABAPENTIN 300 MG/1
300 CAPSULE ORAL 2 TIMES DAILY
COMMUNITY
Start: 2024-04-16 | End: 2024-04-30

## 2024-04-18 NOTE — PROGRESS NOTES
Transitional Care Follow Up Visit  Subjective     David PEACOCK Sanket is a 43 y.o. male who presents for a transitional care management visit.    Within 48 business hours after discharge our office contacted him via telephone to coordinate his care and needs.      I reviewed and discussed the details of that call along with the discharge summary, hospital problems, inpatient lab results, inpatient diagnostic studies, and consultation reports with David.     Current outpatient and discharge medications have been reconciled for the patient.  Reviewed by: CACHORRO Alfonso          4/16/2024     1:14 PM   Date of TCM Phone Call   [facility]   Date of Discharge 4/16/2024   Discharge Disposition Home or Self Care     Risk for Readmission (LACE) No data recorded    History of Present Illness   Course During Hospital Stay:  David is here today for transition of care appointment following admission to [facility] from 4/14/2024 to 4/16/2024 for diagnosis of Open fracture of distal end of left forearm, open displaced transverse fracture of shaft of left radius, open displaced transverse fracture of shaft of left ulna. On 4/15/24, had LUE BBFA ORIF. Has been using ice. Taking hydrocodone, calcium, vitamin D, and gabapentin as prescribed. Has had BM. Has not been taking stool softener. Has resumed normal diet. Has not been lifting. He has follow up scheduled with ortho on 5/10/24. Can remove dressing today.       Review of Systems      Current Outpatient Medications:     calcium carbonate, oyster shell, 500 MG tablet tablet, Take 1 tablet by mouth Daily., Disp: , Rfl:     gabapentin (NEURONTIN) 300 MG capsule, Take 1 capsule by mouth 2 (Two) Times a Day., Disp: , Rfl:     HYDROcodone-acetaminophen (Norco) 5-325 MG per tablet, Take 1 tablet by mouth Every 6 (Six) Hours As Needed., Disp: , Rfl:     methocarbamol (ROBAXIN) 750 MG tablet, Take 1 tablet by mouth 2 (Two) Times a Day., Disp:  ", Rfl:     Vitamin D3 25 MCG (1000 UT) capsule, Take 1 capsule by mouth Daily., Disp: , Rfl:   There are no discontinued medications.      Objective   Vitals:    04/18/24 1616   BP: 139/90   BP Location: Right arm   Patient Position: Sitting   Cuff Size: Large Adult   Pulse: 86   Temp: 98.7 °F (37.1 °C)   TempSrc: Oral   SpO2: 97%   Weight: 90.2 kg (198 lb 12.8 oz)   Height: 182.9 cm (72\")     Body mass index is 26.96 kg/m².      Physical Exam  Vitals reviewed.   Constitutional:       General: He is not in acute distress.     Appearance: Normal appearance. He is well-developed.   HENT:      Head: Normocephalic and atraumatic.   Cardiovascular:      Rate and Rhythm: Normal rate and regular rhythm.   Pulmonary:      Effort: Pulmonary effort is normal.      Breath sounds: Normal breath sounds.   Skin:     Comments: Dressing removed in clinic today. 2 lines of incision sutures clean and intact. Small skin open area clean and healing. Antibacterial ointment applied. Nonadherent dressing applied. Pulses palpable. Mild amount of swelling noted   Neurological:      Mental Status: He is alert and oriented to person, place, and time.   Psychiatric:         Mood and Affect: Mood and affect normal.         Lab Results   Component Value Date    GLUCOSE 88 05/31/2022    BUN 13 05/31/2022    CREATININE 1.16 05/31/2022    EGFR 81.1 05/31/2022    BCR 11.2 05/31/2022    K 4.2 05/31/2022    CO2 25.3 05/31/2022    CALCIUM 9.7 05/31/2022    ALBUMIN 4.40 05/31/2022    BILITOT 0.6 05/31/2022    AST 23 05/31/2022    ALT 30 05/31/2022       No results found for: \"CHOL\", \"CHLPL\", \"TRIG\", \"HDL\", \"LDL\", \"LDLDIRECT\"    Lab Results   Component Value Date    WBC 7.99 05/31/2022    HGB 14.8 05/31/2022    HCT 44.3 05/31/2022    MCV 90.8 05/31/2022     05/31/2022         Assessment & Plan   Assessment & Plan  Type I or II open fracture of distal end of left forearm, initial encounter    Type I or II open displaced transverse fracture of shaft " of left radius, initial encounter    Type I or II open displaced transverse fracture of shaft of left ulna, initial encounter    He is aware of s/s to monitor, how to clean area, no heavy lifting. Has pain medication and knows how to take. He has follow up scheduled with ortho that he will keep. Can take stool softener if develops constipation.   Essential hypertension    Was previously on BP medication. BP okay in office today and has not taken for a couple of years. He will follow up in one month for physical and BP recheck.

## 2024-04-18 NOTE — ASSESSMENT & PLAN NOTE
Was previously on BP medication. BP okay in office today and has not taken for a couple of years. He will follow up in one month for physical and BP recheck.

## 2024-06-14 ENCOUNTER — OFFICE VISIT (OUTPATIENT)
Dept: FAMILY MEDICINE CLINIC | Age: 44
End: 2024-06-14
Payer: COMMERCIAL

## 2024-06-14 VITALS
WEIGHT: 195.4 LBS | TEMPERATURE: 98.5 F | BODY MASS INDEX: 26.47 KG/M2 | SYSTOLIC BLOOD PRESSURE: 155 MMHG | HEART RATE: 79 BPM | OXYGEN SATURATION: 98 % | DIASTOLIC BLOOD PRESSURE: 95 MMHG | HEIGHT: 72 IN

## 2024-06-14 DIAGNOSIS — T81.49XA INCISIONAL INFECTION: Primary | ICD-10-CM

## 2024-06-14 PROCEDURE — 87070 CULTURE OTHR SPECIMN AEROBIC: CPT | Performed by: PHYSICIAN ASSISTANT

## 2024-06-14 PROCEDURE — 99213 OFFICE O/P EST LOW 20 MIN: CPT | Performed by: PHYSICIAN ASSISTANT

## 2024-06-14 PROCEDURE — 87205 SMEAR GRAM STAIN: CPT | Performed by: PHYSICIAN ASSISTANT

## 2024-06-14 NOTE — PROGRESS NOTES
Subjective     CHIEF COMPLAINT    Chief Complaint   Patient presents with    Post-op Problem     Left forearm drainage from surgery on 4/15. Given Bactrim DS on 5/10 at post op visit to prevent deep infection per [facility] note. Pt reports finishing Abx entirely the week of 5/20. Wound is still draining. Pt states he discussed with RN at [facility] and she reports this is normal but would like to check without provider to be sure.            History of Present Illness  This is a 43-year-old male presenting to the clinic with concern for postop wound infection.  He had ORIF at [facility] on 4/15/2024.  He did have 1 postop wound infection treated with Bactrim.  He is concerned because he continues to have drainage from the wound.  He has spoken to the [facility] phone nurse who states this is normal and expected.  However they have not seen it in person so he wanted someone to lay eyes on it to make sure it was okay.  He is otherwise well with no fever or chills.              Review of Systems   Constitutional:  Negative for chills and fever.   Gastrointestinal:  Negative for nausea and vomiting.   Musculoskeletal:  Negative for arthralgias and myalgias.   Skin:  Positive for wound.            History reviewed. No pertinent past medical history.         History reviewed. No pertinent surgical history.         Family History   Problem Relation Age of Onset    Diabetes Mother     Diabetes Maternal Grandmother             Social History     Socioeconomic History    Marital status:    Tobacco Use    Smoking status: Every Day     Current packs/day: 1.00     Average packs/day: 1 pack/day for 20.0 years (20.0 ttl pk-yrs)     Types: Cigarettes     Passive exposure: Never    Smokeless tobacco: Never   Vaping Use    Vaping status: Never Used            No Known Allergies         Current Outpatient Medications on File Prior to Visit   Medication Sig Dispense Refill    calcium carbonate, oyster shell, 500 MG tablet tablet Take 1 tablet by  "mouth Daily. (Patient not taking: Reported on 6/14/2024)      gabapentin (NEURONTIN) 300 MG capsule Take 1 capsule by mouth 2 (Two) Times a Day.      Vitamin D3 25 MCG (1000 UT) capsule Take 1 capsule by mouth Daily. (Patient not taking: Reported on 6/14/2024)       No current facility-administered medications on file prior to visit.            /95 (BP Location: Right arm, Patient Position: Sitting, Cuff Size: Adult)   Pulse 79   Temp 98.5 °F (36.9 °C) (Oral)   Ht 182.9 cm (72\")   Wt 88.6 kg (195 lb 6.4 oz)   SpO2 98%   BMI 26.50 kg/m²          Objective     Physical Exam  Vitals and nursing note reviewed.   Constitutional:       General: He is not in acute distress.     Appearance: Normal appearance.   Pulmonary:      Effort: Pulmonary effort is normal. No respiratory distress.   Skin:     General: Skin is warm and dry.      Findings: No erythema.      Comments: See image below.    Neurological:      Mental Status: He is alert and oriented to person, place, and time.   Psychiatric:         Mood and Affect: Mood normal.         Behavior: Behavior normal.       Incision with small wound shown below.  Purulent to serosanguineous drainage noted.           Assessment & Plan  Incisional infection  Wound culture obtained from drainage.  Will await results prior to initiating any therapy.  Recommend cleaning with warm water and antibacterial soap daily. Also recommend applying Aquaphor or Vaseline.  Keep covered if actively draining or if performing activities where the wound may become contaminated.  Will plan to forward today's note to surgery team at his request.    Orders Placed This Encounter   Procedures    Wound Culture - Surgical Site, Arm, Left                   FOR FULL DISCHARGE INSTRUCTIONS/COMMENTS/HANDOUTS please see the   AVS      "

## 2024-06-18 LAB
BACTERIA SPEC AEROBE CULT: ABNORMAL
BACTERIA SPEC AEROBE CULT: ABNORMAL
GRAM STN SPEC: ABNORMAL

## 2024-06-19 ENCOUNTER — OFFICE VISIT (OUTPATIENT)
Dept: FAMILY MEDICINE CLINIC | Age: 44
End: 2024-06-19
Payer: COMMERCIAL

## 2024-06-19 VITALS
SYSTOLIC BLOOD PRESSURE: 137 MMHG | BODY MASS INDEX: 26.66 KG/M2 | TEMPERATURE: 98.1 F | OXYGEN SATURATION: 98 % | DIASTOLIC BLOOD PRESSURE: 87 MMHG | WEIGHT: 196.8 LBS | HEIGHT: 72 IN | HEART RATE: 89 BPM

## 2024-06-19 DIAGNOSIS — Z72.0 NICOTINE ABUSE: ICD-10-CM

## 2024-06-19 DIAGNOSIS — I10 ESSENTIAL HYPERTENSION: Primary | ICD-10-CM

## 2024-06-19 PROCEDURE — 99214 OFFICE O/P EST MOD 30 MIN: CPT | Performed by: NURSE PRACTITIONER

## 2024-06-19 RX ORDER — LISINOPRIL 5 MG/1
5 TABLET ORAL DAILY
Qty: 90 TABLET | Refills: 1 | Status: SHIPPED | OUTPATIENT
Start: 2024-06-19

## 2024-06-19 NOTE — ASSESSMENT & PLAN NOTE
Patient has been educated on the risk of diseases from using tobacco products such as cancer, COPD, and heart disease and has been advised to quit. I spent less than 3 minutes counseling the patient.

## 2024-06-19 NOTE — PROGRESS NOTES
"Chief Complaint  David Coles presents to [facility] for Hypertension    Subjective          History of Present Illness    David is here today to follow up on his blood pressure. Was previously on BP medications (Lisinopril 10 mg daily) several years ago. BP has been elevated at recent appointments here on 4/18/24 and 6/14/24. He has not been monitoring at home.   Reports arm has been doing well since his appt on Friday. No more drainage. Has follow up scheduled on 6/28/24 with surgeon.     Review of Systems      No Known Allergies   History reviewed. No pertinent past medical history.  Current Outpatient Medications   Medication Sig Dispense Refill    lisinopril (PRINIVIL,ZESTRIL) 5 MG tablet Take 1 tablet by mouth Daily. 90 tablet 1     No current facility-administered medications for this visit.     History reviewed. No pertinent surgical history.   Social History     Tobacco Use    Smoking status: Every Day     Current packs/day: 1.00     Average packs/day: 1 pack/day for 20.0 years (20.0 ttl pk-yrs)     Types: Cigarettes     Passive exposure: Never    Smokeless tobacco: Never   Vaping Use    Vaping status: Never Used     Family History   Problem Relation Age of Onset    Diabetes Mother     Diabetes Maternal Grandmother      There are no preventive care reminders to display for this patient.     Immunization History   Administered Date(s) Administered    Tdap 04/14/2024        Objective     Vitals:    06/19/24 1254   BP: 137/87   BP Location: Left arm   Patient Position: Sitting   Cuff Size: Large Adult   Pulse: 89   Temp: 98.1 °F (36.7 °C)   TempSrc: Oral   SpO2: 98%   Weight: 89.3 kg (196 lb 12.8 oz)   Height: 182.9 cm (72\")     Body mass index is 26.69 kg/m².                No results found.    Physical Exam  Vitals reviewed.   Constitutional:       General: He is not in acute distress.     Appearance: Normal appearance. He is well-developed.   HENT:      Head: " Normocephalic and atraumatic.   Cardiovascular:      Rate and Rhythm: Normal rate and regular rhythm.   Pulmonary:      Effort: Pulmonary effort is normal.      Breath sounds: Normal breath sounds.   Skin:     Comments: Left forearm incision clean and healing, no drainage noted   Neurological:      Mental Status: He is alert and oriented to person, place, and time.   Psychiatric:         Mood and Affect: Mood and affect normal.           Result Review :                               Assessment and Plan      Assessment & Plan  Essential hypertension    BP elevated. Will start Lisinopril 5 mg daily. BP log. Let me know of concerns.   Nicotine abuse  Patient has been educated on the risk of diseases from using tobacco products such as cancer, COPD, and heart disease and has been advised to quit. I spent less than 3 minutes counseling the patient.       New Medications Ordered This Visit   Medications    lisinopril (PRINIVIL,ZESTRIL) 5 MG tablet     Sig: Take 1 tablet by mouth Daily.     Dispense:  90 tablet     Refill:  1               Follow Up     Return in about 10 weeks (around 8/28/2024) for Annual physical.

## 2024-09-06 ENCOUNTER — OFFICE VISIT (OUTPATIENT)
Dept: FAMILY MEDICINE CLINIC | Age: 44
End: 2024-09-06
Payer: COMMERCIAL

## 2024-09-06 VITALS
HEART RATE: 75 BPM | TEMPERATURE: 98.1 F | DIASTOLIC BLOOD PRESSURE: 81 MMHG | WEIGHT: 204.4 LBS | HEIGHT: 72 IN | OXYGEN SATURATION: 98 % | BODY MASS INDEX: 27.68 KG/M2 | SYSTOLIC BLOOD PRESSURE: 134 MMHG

## 2024-09-06 DIAGNOSIS — I10 ESSENTIAL HYPERTENSION: ICD-10-CM

## 2024-09-06 DIAGNOSIS — Z00.00 ANNUAL PHYSICAL EXAM: Primary | ICD-10-CM

## 2024-09-06 PROBLEM — Z72.0 NICOTINE ABUSE: Status: RESOLVED | Noted: 2024-06-19 | Resolved: 2024-09-06

## 2024-09-06 PROCEDURE — 99396 PREV VISIT EST AGE 40-64: CPT | Performed by: NURSE PRACTITIONER

## 2024-09-06 RX ORDER — LISINOPRIL 5 MG/1
5 TABLET ORAL DAILY
Qty: 90 TABLET | Refills: 1 | Status: SHIPPED | OUTPATIENT
Start: 2024-09-06

## 2024-09-06 NOTE — PROGRESS NOTES
Chief Complaint  David Coles presents to [facility] for Annual Exam    Subjective          History of Present Illness    David is here today for annual preventive exam.   Declines covid, PNA, influenza vaccines.  UTD Tdap vaccine.   Former smoker. Quit June 2024.   He is on Lisinopril 5 mg daily for HTN. He is not checking his blood pressure at home.   He may have to have a repeat surgery on his arm. He has a follow up appt next week.     Review of Systems   Constitutional:  Negative for chills and fever.   HENT:  Negative for ear pain and sore throat.    Eyes:  Negative for blurred vision and redness.   Respiratory:  Negative for shortness of breath and wheezing.    Cardiovascular:  Negative for chest pain and palpitations.   Gastrointestinal:  Negative for abdominal pain and vomiting.   Genitourinary:  Negative for frequency and urgency.   Skin:  Negative for rash.   Neurological:  Negative for seizures and syncope.   Psychiatric/Behavioral:  Negative for suicidal ideas and depressed mood.          No Known Allergies   Past Medical History:   Diagnosis Date    Hypertension      Current Outpatient Medications   Medication Sig Dispense Refill    lisinopril (PRINIVIL,ZESTRIL) 5 MG tablet Take 1 tablet by mouth Daily. 90 tablet 1     No current facility-administered medications for this visit.     Past Surgical History:   Procedure Laterality Date    ORIF FOREARM FRACTURE        Social History     Tobacco Use    Smoking status: Former     Average packs/day: 1 pack/day for 29.0 years (29.0 ttl pk-yrs)     Types: Cigarettes     Start date: 1995     Passive exposure: Never    Smokeless tobacco: Never   Vaping Use    Vaping status: Never Used     Family History   Problem Relation Age of Onset    Diabetes Mother     Diabetes Maternal Grandmother      Health Maintenance Due   Topic Date Due    INFLUENZA VACCINE  08/01/2024      Immunization History   Administered Date(s) Administered     "Td (TDVAX) 05/09/1996    Tdap 04/14/2024        Objective     Vitals:    09/06/24 1011   BP: 134/81   Pulse: 75   Temp: 98.1 °F (36.7 °C)   TempSrc: Oral   SpO2: 98%   Weight: 92.7 kg (204 lb 6.4 oz)   Height: 182.9 cm (72\")     Body mass index is 27.72 kg/m².                No results found.    Physical Exam  Vitals reviewed.   Constitutional:       General: He is not in acute distress.     Appearance: Normal appearance.   HENT:      Head: Normocephalic and atraumatic.      Right Ear: Hearing, tympanic membrane and ear canal normal.      Left Ear: Hearing, tympanic membrane and ear canal normal.      Mouth/Throat:      Mouth: Mucous membranes are moist.   Eyes:      Extraocular Movements: Extraocular movements intact.      Pupils: Pupils are equal, round, and reactive to light.   Cardiovascular:      Rate and Rhythm: Normal rate and regular rhythm.   Pulmonary:      Effort: Pulmonary effort is normal. No respiratory distress.      Breath sounds: Normal breath sounds.   Abdominal:      General: Bowel sounds are normal.      Palpations: Abdomen is soft.      Tenderness: There is no abdominal tenderness.   Musculoskeletal:         General: Normal range of motion.      Cervical back: Normal range of motion and neck supple.   Skin:     General: Skin is warm and dry.   Neurological:      Mental Status: He is alert and oriented to person, place, and time.   Psychiatric:         Mood and Affect: Mood normal.           Result Review :                               Assessment and Plan      Assessment & Plan  Annual physical exam  Appropriate screenings and vaccinations were reviewed with the pt and offered as indicated.  Pt counseled on healthy lifestyle including healthy diet, exercise.    Essential hypertension  Hypertension is stable and controlled  Continue current treatment regimen.  Blood pressure will be reassessed in 6 months.    Orders Placed This Encounter   Procedures    CBC (No Diff)    Lipid Panel    " Comprehensive Metabolic Panel     New Medications Ordered This Visit   Medications    lisinopril (PRINIVIL,ZESTRIL) 5 MG tablet     Sig: Take 1 tablet by mouth Daily.     Dispense:  90 tablet     Refill:  1                 Follow Up     Return in about 6 months (around 3/6/2025) for Recheck.

## 2024-09-17 PROCEDURE — 87070 CULTURE OTHR SPECIMN AEROBIC: CPT | Performed by: ORTHOPAEDIC SURGERY

## 2024-09-17 PROCEDURE — 87205 SMEAR GRAM STAIN: CPT | Performed by: ORTHOPAEDIC SURGERY

## 2024-09-18 ENCOUNTER — LAB REQUISITION (OUTPATIENT)
Dept: LAB | Facility: HOSPITAL | Age: 44
End: 2024-09-18
Payer: COMMERCIAL

## 2024-09-18 DIAGNOSIS — M86.8X3: ICD-10-CM

## 2024-09-21 LAB
BACTERIA SPEC AEROBE CULT: NORMAL
GRAM STN SPEC: NORMAL

## 2024-09-23 ENCOUNTER — LAB REQUISITION (OUTPATIENT)
Dept: LAB | Facility: HOSPITAL | Age: 44
End: 2024-09-23
Payer: COMMERCIAL

## 2024-09-23 DIAGNOSIS — M86.9 OSTEOMYELITIS, UNSPECIFIED: ICD-10-CM

## 2024-09-23 LAB
ANION GAP SERPL CALCULATED.3IONS-SCNC: 11.9 MMOL/L (ref 5–15)
BASOPHILS # BLD AUTO: 0.05 10*3/MM3 (ref 0–0.2)
BASOPHILS NFR BLD AUTO: 0.7 % (ref 0–1.5)
BUN SERPL-MCNC: 10 MG/DL (ref 6–20)
BUN/CREAT SERPL: 13 (ref 7–25)
CALCIUM SPEC-SCNC: 9.4 MG/DL (ref 8.6–10.5)
CHLORIDE SERPL-SCNC: 101 MMOL/L (ref 98–107)
CO2 SERPL-SCNC: 26.1 MMOL/L (ref 22–29)
CREAT SERPL-MCNC: 0.77 MG/DL (ref 0.76–1.27)
CRP SERPL-MCNC: <0.3 MG/DL (ref 0–0.5)
DEPRECATED RDW RBC AUTO: 40.3 FL (ref 37–54)
EGFRCR SERPLBLD CKD-EPI 2021: 113.9 ML/MIN/1.73
EOSINOPHIL # BLD AUTO: 0.13 10*3/MM3 (ref 0–0.4)
EOSINOPHIL NFR BLD AUTO: 1.9 % (ref 0.3–6.2)
ERYTHROCYTE [DISTWIDTH] IN BLOOD BY AUTOMATED COUNT: 12.3 % (ref 12.3–15.4)
ERYTHROCYTE [SEDIMENTATION RATE] IN BLOOD: 29 MM/HR (ref 0–15)
GLUCOSE SERPL-MCNC: 93 MG/DL (ref 65–99)
HCT VFR BLD AUTO: 39.1 % (ref 37.5–51)
HGB BLD-MCNC: 13.3 G/DL (ref 13–17.7)
IMM GRANULOCYTES # BLD AUTO: 0.02 10*3/MM3 (ref 0–0.05)
IMM GRANULOCYTES NFR BLD AUTO: 0.3 % (ref 0–0.5)
LYMPHOCYTES # BLD AUTO: 2.67 10*3/MM3 (ref 0.7–3.1)
LYMPHOCYTES NFR BLD AUTO: 39.9 % (ref 19.6–45.3)
MCH RBC QN AUTO: 29.8 PG (ref 26.6–33)
MCHC RBC AUTO-ENTMCNC: 34 G/DL (ref 31.5–35.7)
MCV RBC AUTO: 87.7 FL (ref 79–97)
MONOCYTES # BLD AUTO: 0.36 10*3/MM3 (ref 0.1–0.9)
MONOCYTES NFR BLD AUTO: 5.4 % (ref 5–12)
NEUTROPHILS NFR BLD AUTO: 3.46 10*3/MM3 (ref 1.7–7)
NEUTROPHILS NFR BLD AUTO: 51.8 % (ref 42.7–76)
PLATELET # BLD AUTO: 308 10*3/MM3 (ref 140–450)
PMV BLD AUTO: 10.3 FL (ref 6–12)
POTASSIUM SERPL-SCNC: 4.4 MMOL/L (ref 3.5–5.2)
RBC # BLD AUTO: 4.46 10*6/MM3 (ref 4.14–5.8)
SODIUM SERPL-SCNC: 139 MMOL/L (ref 136–145)
WBC NRBC COR # BLD AUTO: 6.69 10*3/MM3 (ref 3.4–10.8)

## 2024-09-23 PROCEDURE — 85025 COMPLETE CBC W/AUTO DIFF WBC: CPT | Performed by: INTERNAL MEDICINE

## 2024-09-23 PROCEDURE — 86140 C-REACTIVE PROTEIN: CPT | Performed by: INTERNAL MEDICINE

## 2024-09-23 PROCEDURE — 85652 RBC SED RATE AUTOMATED: CPT | Performed by: INTERNAL MEDICINE

## 2024-09-23 PROCEDURE — 80048 BASIC METABOLIC PNL TOTAL CA: CPT | Performed by: INTERNAL MEDICINE

## 2024-09-30 ENCOUNTER — TELEPHONE (OUTPATIENT)
Dept: FAMILY MEDICINE CLINIC | Age: 44
End: 2024-09-30
Payer: COMMERCIAL

## 2024-09-30 PROCEDURE — 80048 BASIC METABOLIC PNL TOTAL CA: CPT | Performed by: INTERNAL MEDICINE

## 2024-09-30 PROCEDURE — 86140 C-REACTIVE PROTEIN: CPT | Performed by: INTERNAL MEDICINE

## 2024-09-30 NOTE — TELEPHONE ENCOUNTER
ON CALL FOR KARIME:   nurse states pts BP has been running high. Previous visits bp was 134/81 on 9/6/24 and 163/100 on 9/17/24, today bp is 190/101. Pt takes 5mg Lisinopril daily and bp's are after pt has taken his meds. He has been dealing with anxiety. Any new orders?

## 2024-10-01 DIAGNOSIS — F41.9 ANXIETY: Primary | ICD-10-CM

## 2024-10-01 DIAGNOSIS — I10 ESSENTIAL HYPERTENSION: ICD-10-CM

## 2024-10-01 RX ORDER — CITALOPRAM HYDROBROMIDE 10 MG/1
10 TABLET ORAL DAILY
Qty: 30 TABLET | Refills: 1 | Status: SHIPPED | OUTPATIENT
Start: 2024-10-01

## 2024-10-01 RX ORDER — LISINOPRIL 10 MG/1
10 TABLET ORAL DAILY
Qty: 90 TABLET | Refills: 0 | Status: SHIPPED | OUTPATIENT
Start: 2024-10-01

## 2024-10-01 NOTE — TELEPHONE ENCOUNTER
Does he think BP med dose should be increased or does he feel that he needs to start anxiety medication?

## 2024-10-01 NOTE — TELEPHONE ENCOUNTER
Pts wife states they think bp med needs to be increased and pt needs to be started on something for anxiety. Rx's need to be sent to [facility]

## 2024-10-07 ENCOUNTER — TELEPHONE (OUTPATIENT)
Dept: FAMILY MEDICINE CLINIC | Age: 44
End: 2024-10-07
Payer: COMMERCIAL

## 2024-10-07 PROCEDURE — 85652 RBC SED RATE AUTOMATED: CPT | Performed by: INTERNAL MEDICINE

## 2024-10-07 PROCEDURE — 85007 BL SMEAR W/DIFF WBC COUNT: CPT | Performed by: INTERNAL MEDICINE

## 2024-10-07 PROCEDURE — 80048 BASIC METABOLIC PNL TOTAL CA: CPT | Performed by: INTERNAL MEDICINE

## 2024-10-07 PROCEDURE — 85027 COMPLETE CBC AUTOMATED: CPT | Performed by: INTERNAL MEDICINE

## 2024-10-07 PROCEDURE — 86140 C-REACTIVE PROTEIN: CPT | Performed by: INTERNAL MEDICINE

## 2024-10-07 NOTE — TELEPHONE ENCOUNTER
HH nurse states that pt is just needing something PRN for anxiety. She and the pt don't think he needs something daily. Please advise.

## 2024-10-08 DIAGNOSIS — F41.9 ANXIETY: Primary | ICD-10-CM

## 2024-10-08 RX ORDER — BUSPIRONE HYDROCHLORIDE 5 MG/1
TABLET ORAL
Qty: 90 TABLET | Refills: 0 | Status: SHIPPED | OUTPATIENT
Start: 2024-10-08

## 2024-10-15 ENCOUNTER — LAB (OUTPATIENT)
Dept: LAB | Facility: HOSPITAL | Age: 44
End: 2024-10-15
Payer: COMMERCIAL

## 2024-10-15 DIAGNOSIS — T81.49XA INCISIONAL INFECTION: Primary | ICD-10-CM

## 2024-10-15 DIAGNOSIS — Z00.00 ANNUAL PHYSICAL EXAM: ICD-10-CM

## 2024-10-15 DIAGNOSIS — T81.49XA INCISIONAL INFECTION: ICD-10-CM

## 2024-10-15 LAB
ALBUMIN SERPL-MCNC: 4.4 G/DL (ref 3.5–5.2)
ALBUMIN/GLOB SERPL: 1.6 G/DL
ALP SERPL-CCNC: 67 U/L (ref 39–117)
ALT SERPL W P-5'-P-CCNC: 37 U/L (ref 1–41)
ANION GAP SERPL CALCULATED.3IONS-SCNC: 8.2 MMOL/L (ref 5–15)
AST SERPL-CCNC: 23 U/L (ref 1–40)
BILIRUB SERPL-MCNC: 0.4 MG/DL (ref 0–1.2)
BUN SERPL-MCNC: 11 MG/DL (ref 6–20)
BUN/CREAT SERPL: 11.1 (ref 7–25)
CALCIUM SPEC-SCNC: 9.6 MG/DL (ref 8.6–10.5)
CHLORIDE SERPL-SCNC: 106 MMOL/L (ref 98–107)
CHOLEST SERPL-MCNC: 205 MG/DL (ref 0–200)
CO2 SERPL-SCNC: 26.8 MMOL/L (ref 22–29)
CREAT SERPL-MCNC: 0.99 MG/DL (ref 0.76–1.27)
CRP SERPL-MCNC: 0.71 MG/DL (ref 0–0.5)
DEPRECATED RDW RBC AUTO: 37.2 FL (ref 37–54)
EGFRCR SERPLBLD CKD-EPI 2021: 96.9 ML/MIN/1.73
ERYTHROCYTE [DISTWIDTH] IN BLOOD BY AUTOMATED COUNT: 11.9 % (ref 12.3–15.4)
ERYTHROCYTE [SEDIMENTATION RATE] IN BLOOD: 13 MM/HR (ref 0–15)
GLOBULIN UR ELPH-MCNC: 2.7 GM/DL
GLUCOSE SERPL-MCNC: 97 MG/DL (ref 65–99)
HCT VFR BLD AUTO: 35.3 % (ref 37.5–51)
HDLC SERPL-MCNC: 25 MG/DL (ref 40–60)
HGB BLD-MCNC: 12.1 G/DL (ref 13–17.7)
LDLC SERPL CALC-MCNC: 133 MG/DL (ref 0–100)
LDLC/HDLC SERPL: 5.11 {RATIO}
MCH RBC QN AUTO: 29.4 PG (ref 26.6–33)
MCHC RBC AUTO-ENTMCNC: 34.3 G/DL (ref 31.5–35.7)
MCV RBC AUTO: 85.9 FL (ref 79–97)
PLATELET # BLD AUTO: 203 10*3/MM3 (ref 140–450)
PMV BLD AUTO: 10.4 FL (ref 6–12)
POTASSIUM SERPL-SCNC: 4.2 MMOL/L (ref 3.5–5.2)
PROT SERPL-MCNC: 7.1 G/DL (ref 6–8.5)
RBC # BLD AUTO: 4.11 10*6/MM3 (ref 4.14–5.8)
SODIUM SERPL-SCNC: 141 MMOL/L (ref 136–145)
TRIGL SERPL-MCNC: 261 MG/DL (ref 0–150)
VLDLC SERPL-MCNC: 47 MG/DL (ref 5–40)
WBC NRBC COR # BLD AUTO: 1.1 10*3/MM3 (ref 3.4–10.8)

## 2024-10-15 PROCEDURE — 36415 COLL VENOUS BLD VENIPUNCTURE: CPT

## 2024-10-15 PROCEDURE — 80053 COMPREHEN METABOLIC PANEL: CPT

## 2024-10-15 PROCEDURE — 85652 RBC SED RATE AUTOMATED: CPT

## 2024-10-15 PROCEDURE — 86140 C-REACTIVE PROTEIN: CPT

## 2024-10-15 PROCEDURE — 85027 COMPLETE CBC AUTOMATED: CPT

## 2024-10-15 PROCEDURE — 80061 LIPID PANEL: CPT

## 2024-10-18 ENCOUNTER — TELEPHONE (OUTPATIENT)
Dept: FAMILY MEDICINE CLINIC | Age: 44
End: 2024-10-18
Payer: COMMERCIAL

## 2024-10-18 ENCOUNTER — READMISSION MANAGEMENT (OUTPATIENT)
Dept: CALL CENTER | Facility: HOSPITAL | Age: 44
End: 2024-10-18
Payer: COMMERCIAL

## 2024-10-18 NOTE — OUTREACH NOTE
Prep Survey      Flowsheet Row Responses   [facility] patient discharged from? Non-BH   Is LACE score < 7 ? Non-BH Discharge   Eligibility [facility]   Hospital [facility]   Date of Discharge 10/18/24   Discharge Disposition Home or Self Care   Discharge diagnosis Unknown   Does the patient have one of the following disease processes/diagnoses(primary or secondary)? Other   Prep survey completed? Yes            Calista SINGH - Registered Nurse

## 2024-10-18 NOTE — TELEPHONE ENCOUNTER
Pt called and stated that he was needing a hospital follow up. He was being discharged from [facility] on 10/18/2024. He stated that he was needing to be seen on Monday or Tuesday because the hospital is needing blookwork completed. His PCP did not have anything available in the days that he was needing so I got him scheduled with Lexy.

## 2024-10-20 ENCOUNTER — TRANSITIONAL CARE MANAGEMENT TELEPHONE ENCOUNTER (OUTPATIENT)
Dept: CALL CENTER | Facility: HOSPITAL | Age: 44
End: 2024-10-20
Payer: COMMERCIAL

## 2024-10-20 NOTE — OUTREACH NOTE
Call Center TCM Note      Flowsheet Row Responses   [facility] patient discharged from? Non-BH   Does the patient have one of the following disease processes/diagnoses(primary or secondary)? Other   TCM attempt successful? No   Unsuccessful attempts Attempt 1            Ena May RN    10/20/2024, 09:28 EDT

## 2024-10-21 ENCOUNTER — TRANSITIONAL CARE MANAGEMENT TELEPHONE ENCOUNTER (OUTPATIENT)
Dept: CALL CENTER | Facility: HOSPITAL | Age: 44
End: 2024-10-21
Payer: COMMERCIAL

## 2024-10-21 ENCOUNTER — LAB (OUTPATIENT)
Dept: LAB | Facility: HOSPITAL | Age: 44
End: 2024-10-21
Payer: COMMERCIAL

## 2024-10-21 ENCOUNTER — OFFICE VISIT (OUTPATIENT)
Dept: FAMILY MEDICINE CLINIC | Age: 44
End: 2024-10-21
Payer: COMMERCIAL

## 2024-10-21 VITALS
TEMPERATURE: 98.1 F | BODY MASS INDEX: 27.63 KG/M2 | OXYGEN SATURATION: 99 % | DIASTOLIC BLOOD PRESSURE: 86 MMHG | HEART RATE: 66 BPM | HEIGHT: 72 IN | SYSTOLIC BLOOD PRESSURE: 130 MMHG | WEIGHT: 204 LBS

## 2024-10-21 DIAGNOSIS — D70.2 OTHER DRUG-INDUCED NEUTROPENIA: Primary | ICD-10-CM

## 2024-10-21 DIAGNOSIS — D70.2 OTHER DRUG-INDUCED NEUTROPENIA: ICD-10-CM

## 2024-10-21 LAB
BASOPHILS # BLD AUTO: 0.03 10*3/MM3 (ref 0–0.2)
BASOPHILS NFR BLD AUTO: 0.5 % (ref 0–1.5)
DEPRECATED RDW RBC AUTO: 38.5 FL (ref 37–54)
EOSINOPHIL # BLD AUTO: 0.18 10*3/MM3 (ref 0–0.4)
EOSINOPHIL NFR BLD AUTO: 2.8 % (ref 0.3–6.2)
ERYTHROCYTE [DISTWIDTH] IN BLOOD BY AUTOMATED COUNT: 11.9 % (ref 12.3–15.4)
HCT VFR BLD AUTO: 38.9 % (ref 37.5–51)
HGB BLD-MCNC: 13.1 G/DL (ref 13–17.7)
IMM GRANULOCYTES # BLD AUTO: 0.12 10*3/MM3 (ref 0–0.05)
IMM GRANULOCYTES NFR BLD AUTO: 1.9 % (ref 0–0.5)
LYMPHOCYTES # BLD AUTO: 3.02 10*3/MM3 (ref 0.7–3.1)
LYMPHOCYTES NFR BLD AUTO: 47.1 % (ref 19.6–45.3)
MCH RBC QN AUTO: 29.4 PG (ref 26.6–33)
MCHC RBC AUTO-ENTMCNC: 33.7 G/DL (ref 31.5–35.7)
MCV RBC AUTO: 87.4 FL (ref 79–97)
MONOCYTES # BLD AUTO: 0.54 10*3/MM3 (ref 0.1–0.9)
MONOCYTES NFR BLD AUTO: 8.4 % (ref 5–12)
NEUTROPHILS NFR BLD AUTO: 2.52 10*3/MM3 (ref 1.7–7)
NEUTROPHILS NFR BLD AUTO: 39.3 % (ref 42.7–76)
PLATELET # BLD AUTO: 229 10*3/MM3 (ref 140–450)
PMV BLD AUTO: 9.7 FL (ref 6–12)
RBC # BLD AUTO: 4.45 10*6/MM3 (ref 4.14–5.8)
WBC NRBC COR # BLD AUTO: 6.41 10*3/MM3 (ref 3.4–10.8)

## 2024-10-21 PROCEDURE — 99213 OFFICE O/P EST LOW 20 MIN: CPT | Performed by: NURSE PRACTITIONER

## 2024-10-21 PROCEDURE — 36415 COLL VENOUS BLD VENIPUNCTURE: CPT

## 2024-10-21 PROCEDURE — 85025 COMPLETE CBC W/AUTO DIFF WBC: CPT

## 2024-10-21 RX ORDER — FOLIC ACID 1 MG/1
1 TABLET ORAL DAILY
COMMUNITY

## 2024-10-21 RX ORDER — SULFAMETHOXAZOLE/TRIMETHOPRIM 800-160 MG
1 TABLET ORAL 2 TIMES DAILY
COMMUNITY

## 2024-10-21 NOTE — OUTREACH NOTE
Call Center TCM Note      Flowsheet Row Responses   [facility] patient discharged from? Non-BH   Does the patient have one of the following disease processes/diagnoses(primary or secondary)? Other   TCM attempt successful? Yes   TCM call completed? Yes  [PCP appt today, no call needed.]   Wrap up additional comments PCP [facility]. Patient completed a PCP appt today with Lucrecia Pugh APRN. This appt, within 2 business days of discharge, fulfills TCM requirement, no call needed.            Laura Mena RN    10/21/2024, 10:27 EDT

## 2024-10-21 NOTE — PROGRESS NOTES
"Chief Complaint  Hospital Follow Up Visit ([facility] - 10/15/24 10/18/24 - Neutropenia)    Subjective          David Coles presents to [facility]  History of Present Illness  This is a patient of Bailey Collier who is following up after being discharged from [facility].    He recently had a left arm fracture and had complications related to infection, requiring cement spacer placement and recent washout a few weeks ago.  He is receiving IV cefepime through PICC line.  His recent white blood count was 1.1, so was recommended to go to the ED.  His white blood count at the ER was 1.4, hemoglobin 13, platelets 229.  He was transferred to [facility].  He had not been having any fevers, chills, GI/ symptoms.  He had been having sweats in the past few days.  Infectious disease and hematology were consulted.  They suspected that the medication induced the neutropenia.  Infectious disease recommended IV cefepime be transition to p.o. Bactrim 8 more days on discharge to finish total course.  They recommend continuing B12 and folic supplementation.  His PICC line was removed at the hospital. He has not had any sweats since he's been home. He feels that he is doing okay.       Objective   Vital Signs:   /86 (BP Location: Left arm, Patient Position: Sitting, Cuff Size: Large Adult)   Pulse 66   Temp 98.1 °F (36.7 °C) (Oral)   Ht 182.9 cm (72\")   Wt 92.5 kg (204 lb)   SpO2 99%   BMI 27.67 kg/m²     Physical Exam  Constitutional:       General: He is not in acute distress.     Appearance: Normal appearance. He is normal weight.   HENT:      Head: Normocephalic.   Eyes:      Pupils: Pupils are equal, round, and reactive to light.      Visual Fields: Right eye visual fields normal and left eye visual fields normal.   Neck:      Trachea: Trachea normal.   Cardiovascular:      Rate and Rhythm: Normal rate and regular rhythm.      Heart sounds: Normal heart sounds.   Pulmonary:      " Effort: Pulmonary effort is normal.      Breath sounds: Normal breath sounds and air entry.   Musculoskeletal:      Right lower leg: No edema.      Left lower leg: No edema.   Skin:     General: Skin is warm and dry.   Neurological:      Mental Status: He is alert and oriented to person, place, and time.   Psychiatric:         Mood and Affect: Mood and affect normal.         Behavior: Behavior normal.         Thought Content: Thought content normal.        Result Review :   The following data was reviewed by: CACHORRO Swanson on 10/21/2024:                  Assessment and Plan    Diagnoses and all orders for this visit:    1. Other drug-induced neutropenia (Primary)  -     CBC w AUTO Differential; Future    He has not had any sweats upon discharge.  Will get a CBC today and another CBC in 1 week.            Follow Up   Return for Pending test results.  Patient was given instructions and counseling regarding his condition or for health maintenance advice. Please see specific information pulled into the AVS if appropriate.

## 2024-10-28 ENCOUNTER — LAB (OUTPATIENT)
Dept: LAB | Facility: HOSPITAL | Age: 44
End: 2024-10-28
Payer: COMMERCIAL

## 2024-10-28 DIAGNOSIS — D70.2 OTHER DRUG-INDUCED NEUTROPENIA: ICD-10-CM

## 2024-10-28 LAB
BASOPHILS # BLD AUTO: 0.03 10*3/MM3 (ref 0–0.2)
BASOPHILS NFR BLD AUTO: 0.4 % (ref 0–1.5)
DEPRECATED RDW RBC AUTO: 38.5 FL (ref 37–54)
EOSINOPHIL # BLD AUTO: 0.1 10*3/MM3 (ref 0–0.4)
EOSINOPHIL NFR BLD AUTO: 1.3 % (ref 0.3–6.2)
ERYTHROCYTE [DISTWIDTH] IN BLOOD BY AUTOMATED COUNT: 11.7 % (ref 12.3–15.4)
HCT VFR BLD AUTO: 39.7 % (ref 37.5–51)
HGB BLD-MCNC: 13.1 G/DL (ref 13–17.7)
IMM GRANULOCYTES # BLD AUTO: 0.01 10*3/MM3 (ref 0–0.05)
IMM GRANULOCYTES NFR BLD AUTO: 0.1 % (ref 0–0.5)
LYMPHOCYTES # BLD AUTO: 3.17 10*3/MM3 (ref 0.7–3.1)
LYMPHOCYTES NFR BLD AUTO: 42.4 % (ref 19.6–45.3)
MCH RBC QN AUTO: 29.3 PG (ref 26.6–33)
MCHC RBC AUTO-ENTMCNC: 33 G/DL (ref 31.5–35.7)
MCV RBC AUTO: 88.8 FL (ref 79–97)
MONOCYTES # BLD AUTO: 0.31 10*3/MM3 (ref 0.1–0.9)
MONOCYTES NFR BLD AUTO: 4.1 % (ref 5–12)
NEUTROPHILS NFR BLD AUTO: 3.85 10*3/MM3 (ref 1.7–7)
NEUTROPHILS NFR BLD AUTO: 51.7 % (ref 42.7–76)
PLATELET # BLD AUTO: 268 10*3/MM3 (ref 140–450)
PMV BLD AUTO: 9.9 FL (ref 6–12)
RBC # BLD AUTO: 4.47 10*6/MM3 (ref 4.14–5.8)
WBC NRBC COR # BLD AUTO: 7.47 10*3/MM3 (ref 3.4–10.8)

## 2024-10-28 PROCEDURE — 36415 COLL VENOUS BLD VENIPUNCTURE: CPT

## 2024-10-28 PROCEDURE — 85025 COMPLETE CBC W/AUTO DIFF WBC: CPT

## 2024-11-11 ENCOUNTER — LAB (OUTPATIENT)
Dept: LAB | Facility: HOSPITAL | Age: 44
End: 2024-11-11
Payer: COMMERCIAL

## 2024-11-11 ENCOUNTER — TRANSCRIBE ORDERS (OUTPATIENT)
Dept: LAB | Facility: HOSPITAL | Age: 44
End: 2024-11-11
Payer: COMMERCIAL

## 2024-11-11 DIAGNOSIS — M86.632: ICD-10-CM

## 2024-11-11 DIAGNOSIS — M86.632: Primary | ICD-10-CM

## 2024-11-11 LAB
CRP SERPL-MCNC: <0.3 MG/DL (ref 0–0.5)
ERYTHROCYTE [SEDIMENTATION RATE] IN BLOOD: 13 MM/HR (ref 0–15)

## 2024-11-11 PROCEDURE — 86140 C-REACTIVE PROTEIN: CPT

## 2024-11-11 PROCEDURE — 36415 COLL VENOUS BLD VENIPUNCTURE: CPT

## 2024-11-11 PROCEDURE — 85652 RBC SED RATE AUTOMATED: CPT

## 2024-11-19 ENCOUNTER — LAB (OUTPATIENT)
Dept: LAB | Facility: HOSPITAL | Age: 44
End: 2024-11-19
Payer: COMMERCIAL

## 2024-11-19 ENCOUNTER — OFFICE VISIT (OUTPATIENT)
Dept: FAMILY MEDICINE CLINIC | Age: 44
End: 2024-11-19
Payer: COMMERCIAL

## 2024-11-19 VITALS
SYSTOLIC BLOOD PRESSURE: 142 MMHG | BODY MASS INDEX: 27.74 KG/M2 | HEIGHT: 72 IN | HEART RATE: 67 BPM | TEMPERATURE: 98 F | OXYGEN SATURATION: 98 % | WEIGHT: 204.8 LBS | DIASTOLIC BLOOD PRESSURE: 78 MMHG

## 2024-11-19 DIAGNOSIS — D70.2 OTHER DRUG-INDUCED NEUTROPENIA: ICD-10-CM

## 2024-11-19 DIAGNOSIS — I10 ESSENTIAL HYPERTENSION: ICD-10-CM

## 2024-11-19 DIAGNOSIS — I10 ESSENTIAL HYPERTENSION: Primary | ICD-10-CM

## 2024-11-19 DIAGNOSIS — F41.9 ANXIETY: ICD-10-CM

## 2024-11-19 LAB
ALBUMIN SERPL-MCNC: 5 G/DL (ref 3.5–5.2)
ALBUMIN/GLOB SERPL: 1.7 G/DL
ALP SERPL-CCNC: 83 U/L (ref 39–117)
ALT SERPL W P-5'-P-CCNC: 40 U/L (ref 1–41)
ANION GAP SERPL CALCULATED.3IONS-SCNC: 12 MMOL/L (ref 5–15)
AST SERPL-CCNC: 25 U/L (ref 1–40)
BASOPHILS # BLD AUTO: 0.03 10*3/MM3 (ref 0–0.2)
BASOPHILS NFR BLD AUTO: 0.3 % (ref 0–1.5)
BILIRUB SERPL-MCNC: 0.5 MG/DL (ref 0–1.2)
BUN SERPL-MCNC: 14 MG/DL (ref 6–20)
BUN/CREAT SERPL: 13.6 (ref 7–25)
CALCIUM SPEC-SCNC: 10.2 MG/DL (ref 8.6–10.5)
CHLORIDE SERPL-SCNC: 100 MMOL/L (ref 98–107)
CO2 SERPL-SCNC: 26 MMOL/L (ref 22–29)
CREAT SERPL-MCNC: 1.03 MG/DL (ref 0.76–1.27)
DEPRECATED RDW RBC AUTO: 39.5 FL (ref 37–54)
EGFRCR SERPLBLD CKD-EPI 2021: 91.9 ML/MIN/1.73
EOSINOPHIL # BLD AUTO: 0.09 10*3/MM3 (ref 0–0.4)
EOSINOPHIL NFR BLD AUTO: 1 % (ref 0.3–6.2)
ERYTHROCYTE [DISTWIDTH] IN BLOOD BY AUTOMATED COUNT: 12.4 % (ref 12.3–15.4)
FOLATE SERPL-MCNC: 16.4 NG/ML (ref 4.78–24.2)
GLOBULIN UR ELPH-MCNC: 2.9 GM/DL
GLUCOSE SERPL-MCNC: 89 MG/DL (ref 65–99)
HCT VFR BLD AUTO: 45.1 % (ref 37.5–51)
HGB BLD-MCNC: 15.2 G/DL (ref 13–17.7)
IMM GRANULOCYTES # BLD AUTO: 0.01 10*3/MM3 (ref 0–0.05)
IMM GRANULOCYTES NFR BLD AUTO: 0.1 % (ref 0–0.5)
LYMPHOCYTES # BLD AUTO: 2.79 10*3/MM3 (ref 0.7–3.1)
LYMPHOCYTES NFR BLD AUTO: 32.3 % (ref 19.6–45.3)
MCH RBC QN AUTO: 29.1 PG (ref 26.6–33)
MCHC RBC AUTO-ENTMCNC: 33.7 G/DL (ref 31.5–35.7)
MCV RBC AUTO: 86.4 FL (ref 79–97)
MONOCYTES # BLD AUTO: 0.33 10*3/MM3 (ref 0.1–0.9)
MONOCYTES NFR BLD AUTO: 3.8 % (ref 5–12)
NEUTROPHILS NFR BLD AUTO: 5.38 10*3/MM3 (ref 1.7–7)
NEUTROPHILS NFR BLD AUTO: 62.5 % (ref 42.7–76)
PLATELET # BLD AUTO: 277 10*3/MM3 (ref 140–450)
PMV BLD AUTO: 10.1 FL (ref 6–12)
POTASSIUM SERPL-SCNC: 4.3 MMOL/L (ref 3.5–5.2)
PROT SERPL-MCNC: 7.9 G/DL (ref 6–8.5)
RBC # BLD AUTO: 5.22 10*6/MM3 (ref 4.14–5.8)
SODIUM SERPL-SCNC: 138 MMOL/L (ref 136–145)
VIT B12 BLD-MCNC: 878 PG/ML (ref 211–946)
WBC NRBC COR # BLD AUTO: 8.63 10*3/MM3 (ref 3.4–10.8)

## 2024-11-19 PROCEDURE — 99213 OFFICE O/P EST LOW 20 MIN: CPT

## 2024-11-19 PROCEDURE — 80053 COMPREHEN METABOLIC PANEL: CPT

## 2024-11-19 PROCEDURE — 85025 COMPLETE CBC W/AUTO DIFF WBC: CPT

## 2024-11-19 PROCEDURE — 82607 VITAMIN B-12: CPT

## 2024-11-19 PROCEDURE — 82746 ASSAY OF FOLIC ACID SERUM: CPT

## 2024-11-19 PROCEDURE — 36415 COLL VENOUS BLD VENIPUNCTURE: CPT

## 2024-11-19 RX ORDER — BUSPIRONE HYDROCHLORIDE 5 MG/1
5 TABLET ORAL 2 TIMES DAILY PRN
COMMUNITY

## 2024-11-19 RX ORDER — LISINOPRIL 10 MG/1
10 TABLET ORAL DAILY
COMMUNITY

## 2024-11-19 NOTE — PROGRESS NOTES
Subjective     CHIEF COMPLAINT    Chief Complaint   Patient presents with    Hypertension     170/94, 153/86 home readings       History of Present Illness  Patient is a 44 year old male, presenting to the clinic today with concerns of high blood pressure. Reports this morning he woke up and just didn't feel right. He checked his blood pressure and it was elevated. He is on lisinopril 10mg daily. Prior to today, had not checked his blood pressure in a while. When he was checking it routinely, it was normal, around 130/70. Lisinopril was recently increased at beginning of October from 5mg daily to 10mg daily. He endorses a low appetitie and just feeling off. He notes a lot of health issues recently with osteomyelitis of left forearm and drug induced neutropenia. He is concerned today that his white blood cell count may be low again. He has not had any fevers, chills, night sweats. No redness or swelling of left forearm. Scheduled for grafting at the beginning of December.  He reports his stomach is in knots. Has had several small bowel movements today. He is prescribed buspar but has not been taking much. He states is very worried about things every since having these recent health issues.     He was prescribed vitamin b12 and folic acid in the hospital after his stay for neutropenia and reports he just finished those medications.       Review of Systems   Constitutional:  Positive for appetite change (decreased). Negative for chills, diaphoresis, fatigue and fever.   Respiratory:  Negative for cough, shortness of breath and wheezing.    Cardiovascular:  Negative for chest pain, palpitations and leg swelling.   Musculoskeletal:  Negative for myalgias.   Psychiatric/Behavioral:  The patient is nervous/anxious.             Past Medical History:   Diagnosis Date    Hypertension             Past Surgical History:   Procedure Laterality Date    ORIF FOREARM FRACTURE              Family History   Problem Relation Age of  "Onset    Diabetes Mother     Diabetes Maternal Grandmother             Social History     Socioeconomic History    Marital status:    Tobacco Use    Smoking status: Former     Current packs/day: 0.00     Average packs/day: 1 pack/day for 29.0 years (29.0 ttl pk-yrs)     Types: Cigarettes     Start date:      Quit date:      Years since quittin.8     Passive exposure: Never    Smokeless tobacco: Never   Vaping Use    Vaping status: Never Used            Allergies   Allergen Reactions    Cefepime Other (See Comments)            Current Outpatient Medications on File Prior to Visit   Medication Sig Dispense Refill    busPIRone (BUSPAR) 5 MG tablet Take 1 tablet by mouth 2 (Two) Times a Day As Needed (anxiety).      folic acid (FOLVITE) 1 MG tablet Take 1 tablet by mouth Daily.      lisinopril (PRINIVIL,ZESTRIL) 10 MG tablet Take 1 tablet by mouth Daily.      [DISCONTINUED] lisinopril (PRINIVIL,ZESTRIL) 10 MG tablet Take 1 tablet by mouth Daily. 90 tablet 0    [DISCONTINUED] sulfamethoxazole-trimethoprim (BACTRIM DS,SEPTRA DS) 800-160 MG per tablet Take 1 tablet by mouth 2 (Two) Times a Day.       No current facility-administered medications on file prior to visit.     /78   Pulse 67   Temp 98 °F (36.7 °C) (Oral)   Ht 182.9 cm (72\")   Wt 92.9 kg (204 lb 12.8 oz)   SpO2 98%   BMI 27.78 kg/m²     Objective     Physical Exam  Vitals and nursing note reviewed.   Constitutional:       General: He is not in acute distress.     Appearance: Normal appearance. He is not ill-appearing.   HENT:      Head: Normocephalic.      Mouth/Throat:      Lips: Pink.   Eyes:      Extraocular Movements: Extraocular movements intact.   Neck:      Vascular: No carotid bruit.   Cardiovascular:      Rate and Rhythm: Normal rate and regular rhythm.      Heart sounds: Normal heart sounds. No murmur heard.  Pulmonary:      Effort: Pulmonary effort is normal. No accessory muscle usage or respiratory distress.      Breath " sounds: Normal breath sounds. No wheezing or rhonchi.   Musculoskeletal:      Cervical back: Normal range of motion.      Comments: Left forearm without erythema, warmth or swelling. Scarring present    Neurological:      Mental Status: He is alert.   Psychiatric:         Mood and Affect: Affect normal. Mood is anxious.       Assessment & Plan  Essential hypertension      Orders:    Comprehensive metabolic panel; Future    Anxiety         Other drug-induced neutropenia    Orders:    CBC w AUTO Differential; Future    Vitamin B12; Future    Folate; Future       Blood pressure is slightly elevated in office today, but improved on recheck although still elevated. Patient is anxious today. Given his recent health issues, he is worried about his health and reports he is not working so he has time to sit at home and think. I discussed with him that it is possible his anxiety is contributing to his blood pressure being elevated. He has buspar at home and I recommended that he trial that to see if it helps his anxiety, and then monitor his blood pressure at home once he feels less anxious. We will check some labs today to ensure that his WBC are stable, and see where his vitamin b12 and folate level are now that he has completed the medication prescribed per the hospital. If his blood pressure remains elevated despite use of buspar, notify clinic for recommendations. Also recommend counseling if he is interested. Return and ER precautions advised.       Follow up:  Return if symptoms worsen or fail to improve.  Patient was given instructions and counseling regarding his condition or for health maintenance advice. Please see specific information pulled into the AVS if appropriate.

## 2024-11-19 NOTE — Clinical Note
Patient seen for acute visit for high blood pressure, upon discussion he reports significant anxiety. I recommended he try to take his buspar and see how his blood pressure responds to that and then notify clinic if blood pressure remains elevated. Any further recommendations?

## 2024-11-27 ENCOUNTER — OFFICE VISIT (OUTPATIENT)
Dept: FAMILY MEDICINE CLINIC | Age: 44
End: 2024-11-27
Payer: COMMERCIAL

## 2024-11-27 VITALS
WEIGHT: 204 LBS | SYSTOLIC BLOOD PRESSURE: 149 MMHG | HEART RATE: 67 BPM | HEIGHT: 72 IN | OXYGEN SATURATION: 99 % | TEMPERATURE: 98.1 F | BODY MASS INDEX: 27.63 KG/M2 | DIASTOLIC BLOOD PRESSURE: 84 MMHG

## 2024-11-27 DIAGNOSIS — I10 ESSENTIAL HYPERTENSION: Primary | ICD-10-CM

## 2024-11-27 DIAGNOSIS — F41.9 ANXIETY: ICD-10-CM

## 2024-11-27 PROCEDURE — 99214 OFFICE O/P EST MOD 30 MIN: CPT | Performed by: NURSE PRACTITIONER

## 2024-11-27 RX ORDER — LISINOPRIL 20 MG/1
20 TABLET ORAL DAILY
Qty: 90 TABLET | Refills: 0 | Status: SHIPPED | OUTPATIENT
Start: 2024-11-27

## 2024-11-27 NOTE — ASSESSMENT & PLAN NOTE
Will continue buspirone as needed for anxiety. Advised that he could take daily for short term and can try taking 3 tablets of the 5 mg dose at one time if he does not feel that two are helping. He declines need to change to 10 mg tablet at this time. Denies need for refill at this time.

## 2024-11-27 NOTE — ASSESSMENT & PLAN NOTE
Will increase Lisinopril dose to 20 mg daily. Continue BP log. Let me know of concerns.     Orders:    lisinopril (PRINIVIL,ZESTRIL) 20 MG tablet; Take 1 tablet by mouth Daily.

## 2024-11-27 NOTE — PROGRESS NOTES
"Chief Complaint  David Coles presents to [facility] for Hypertension (Discuss BP)    Subjective          History of Present Illness    David is here today to follow up on HTN. Current medication is Lisinopril 10 mg daily. At home BP checks have typically been 150s-160s/90s.   He is also taking buspirone for anxiety. He has been taking as needed. He feels helping some. He feels two tablets help better than one.       Review of Systems      Allergies   Allergen Reactions    Cefepime Other (See Comments)      Past Medical History:   Diagnosis Date    Hypertension      Current Outpatient Medications   Medication Sig Dispense Refill    busPIRone (BUSPAR) 5 MG tablet Take 1 tablet by mouth 2 (Two) Times a Day As Needed (anxiety).      lisinopril (PRINIVIL,ZESTRIL) 20 MG tablet Take 1 tablet by mouth Daily. 90 tablet 0     No current facility-administered medications for this visit.     Past Surgical History:   Procedure Laterality Date    ORIF FOREARM FRACTURE        Social History     Tobacco Use    Smoking status: Former     Current packs/day: 0.00     Average packs/day: 1 pack/day for 29.0 years (29.0 ttl pk-yrs)     Types: Cigarettes     Start date:      Quit date:      Years since quittin.9     Passive exposure: Never    Smokeless tobacco: Never   Vaping Use    Vaping status: Never Used     Family History   Problem Relation Age of Onset    Diabetes Mother     Diabetes Maternal Grandmother      There are no preventive care reminders to display for this patient.   Immunization History   Administered Date(s) Administered    Td (TDVAX) 1996    Tdap 2024        Objective     Vitals:    24 0940   BP: 149/84   Pulse: 67   Temp: 98.1 °F (36.7 °C)   TempSrc: Oral   SpO2: 99%   Weight: 92.5 kg (204 lb)   Height: 182.9 cm (72\")     Body mass index is 27.67 kg/m².              No results found.    Physical Exam  Vitals reviewed.   Constitutional:       General: " He is not in acute distress.     Appearance: Normal appearance. He is well-developed.   HENT:      Head: Normocephalic and atraumatic.   Cardiovascular:      Rate and Rhythm: Normal rate and regular rhythm.   Pulmonary:      Effort: Pulmonary effort is normal.      Breath sounds: Normal breath sounds.   Neurological:      Mental Status: He is alert and oriented to person, place, and time.   Psychiatric:         Mood and Affect: Mood and affect normal.           Result Review :                               Assessment and Plan      Assessment & Plan  Essential hypertension    Will increase Lisinopril dose to 20 mg daily. Continue BP log. Let me know of concerns.     Orders:    lisinopril (PRINIVIL,ZESTRIL) 20 MG tablet; Take 1 tablet by mouth Daily.    Anxiety  Will continue buspirone as needed for anxiety. Advised that he could take daily for short term and can try taking 3 tablets of the 5 mg dose at one time if he does not feel that two are helping. He declines need to change to 10 mg tablet at this time. Denies need for refill at this time.                  Follow Up     Return in about 6 weeks (around 1/8/2025) for Recheck.

## 2024-12-10 RX ORDER — BUSPIRONE HYDROCHLORIDE 5 MG/1
5 TABLET ORAL 2 TIMES DAILY PRN
Qty: 180 TABLET | Refills: 0 | Status: SHIPPED | OUTPATIENT
Start: 2024-12-10

## 2024-12-10 NOTE — TELEPHONE ENCOUNTER
Caller: Vita Coleslulu PEACOCK    Relationship: Self    Best call back number: [phone]     Requested Prescriptions:   Requested Prescriptions     Pending Prescriptions Disp Refills    busPIRone (BUSPAR) 5 MG tablet       Sig: Take 1 tablet by mouth 2 (Two) Times a Day As Needed (anxiety).        Pharmacy where request should be sent: [facility] [id number] [place] - [address] [facility] - [phone]  - [phone] FX     Last office visit with prescribing clinician: 11/27/2024   Last telemedicine visit with prescribing clinician: Visit date not found   Next office visit with prescribing clinician: 1/10/2025     Does the patient have less than a 3 day supply:  [x] Yes  [] No    Would you like a call back once the refill request has been completed: [] Yes [x] No    If the office needs to give you a call back, can they leave a voicemail: [] Yes [x] No    Candace Menezes Rep   12/10/24 08:32 EST

## 2024-12-11 ENCOUNTER — OFFICE VISIT (OUTPATIENT)
Dept: FAMILY MEDICINE CLINIC | Age: 44
End: 2024-12-11
Payer: COMMERCIAL

## 2024-12-11 VITALS
WEIGHT: 202 LBS | OXYGEN SATURATION: 100 % | HEART RATE: 80 BPM | BODY MASS INDEX: 27.36 KG/M2 | TEMPERATURE: 98.6 F | HEIGHT: 72 IN | DIASTOLIC BLOOD PRESSURE: 86 MMHG | SYSTOLIC BLOOD PRESSURE: 142 MMHG

## 2024-12-11 DIAGNOSIS — F41.9 ANXIETY: ICD-10-CM

## 2024-12-11 DIAGNOSIS — I10 ESSENTIAL HYPERTENSION: ICD-10-CM

## 2024-12-11 PROCEDURE — 99214 OFFICE O/P EST MOD 30 MIN: CPT | Performed by: NURSE PRACTITIONER

## 2024-12-11 RX ORDER — LISINOPRIL 30 MG/1
30 TABLET ORAL DAILY
Qty: 90 TABLET | Refills: 0 | Status: SHIPPED | OUTPATIENT
Start: 2024-12-11

## 2024-12-11 RX ORDER — HYDROCODONE BITARTRATE AND ACETAMINOPHEN 10; 325 MG/1; MG/1
TABLET ORAL AS NEEDED
COMMUNITY
Start: 2024-12-06

## 2024-12-11 RX ORDER — SULFAMETHOXAZOLE AND TRIMETHOPRIM 800; 160 MG/1; MG/1
1 TABLET ORAL 2 TIMES DAILY
COMMUNITY
Start: 2024-12-06

## 2024-12-11 RX ORDER — HYDROCODONE BITARTRATE AND ACETAMINOPHEN 7.5; 325 MG/1; MG/1
TABLET ORAL
COMMUNITY
Start: 2024-09-27 | End: 2024-12-11

## 2024-12-11 RX ORDER — ONDANSETRON 4 MG/1
4 TABLET, FILM COATED ORAL AS NEEDED
COMMUNITY
Start: 2024-09-19

## 2024-12-11 NOTE — ASSESSMENT & PLAN NOTE
Medical condition is stable.  Continue same therapy.  Will recheck at next regular appointment

## 2024-12-11 NOTE — PROGRESS NOTES
Chief Complaint  David Coles presents to [facility] for Hypertension    Subjective          History of Present Illness    David is here today to follow up on HTN. Current medication is Lisinopril 20 mg daily. Dose was increased at visit on 24 from 10 mg. Reports BP at other visits and at home have been 150s.   He is also taking buspirone for anxiety. He has been taking as needed. He feels helping. Typically takes 2 tablets.     Review of Systems      Allergies   Allergen Reactions    Cefepime Other (See Comments)      Past Medical History:   Diagnosis Date    Hypertension      Current Outpatient Medications   Medication Sig Dispense Refill    busPIRone (BUSPAR) 5 MG tablet Take 1 tablet by mouth 2 (Two) Times a Day As Needed (anxiety). 180 tablet 0    HYDROcodone-acetaminophen (NORCO)  MG per tablet Take  by mouth As Needed.      lisinopril (PRINIVIL,ZESTRIL) 30 MG tablet Take 1 tablet by mouth Daily. 90 tablet 0    ondansetron (ZOFRAN) 4 MG tablet Take 1 tablet by mouth As Needed.      sulfamethoxazole-trimethoprim (BACTRIM DS,SEPTRA DS) 800-160 MG per tablet Take 1 tablet by mouth 2 (Two) Times a Day.       No current facility-administered medications for this visit.     Past Surgical History:   Procedure Laterality Date    ORIF FOREARM FRACTURE        Social History     Tobacco Use    Smoking status: Former     Current packs/day: 0.00     Average packs/day: 1 pack/day for 29.0 years (29.0 ttl pk-yrs)     Types: Cigarettes     Start date:      Quit date:      Years since quittin.9     Passive exposure: Never    Smokeless tobacco: Never   Vaping Use    Vaping status: Never Used     Family History   Problem Relation Age of Onset    Diabetes Mother     Diabetes Maternal Grandmother      There are no preventive care reminders to display for this patient.   Immunization History   Administered Date(s) Administered    Td (TDVAX) 1996    Tdap 2024  "       Objective     Vitals:    12/11/24 1006   BP: 142/86   Pulse: 80   Temp: 98.6 °F (37 °C)   TempSrc: Oral   SpO2: 100%   Weight: 91.6 kg (202 lb)   Height: 182.9 cm (72\")     Body mass index is 27.4 kg/m².            No results found.    Physical Exam  Vitals reviewed.   Constitutional:       General: He is not in acute distress.     Appearance: Normal appearance. He is well-developed.   HENT:      Head: Normocephalic and atraumatic.   Cardiovascular:      Rate and Rhythm: Normal rate and regular rhythm.   Pulmonary:      Effort: Pulmonary effort is normal.      Breath sounds: Normal breath sounds.   Skin:     Comments: Left arm in dressing/cast. Marked area of drainage has not increased from area marked. +capillary refill. Able to wiggle fingers.   Dressing to left side of abdomen dry, intact.    Neurological:      Mental Status: He is alert and oriented to person, place, and time.   Psychiatric:         Mood and Affect: Mood and affect normal.           Result Review :                               Assessment and Plan      Assessment & Plan  Essential hypertension    BP with some elevation. Will increase Lisinopril dose to 30 mg daily. Let me know of concerns. Keep scheduled appt on 1/10/25 or be seen sooner as needed.     Orders:    lisinopril (PRINIVIL,ZESTRIL) 30 MG tablet; Take 1 tablet by mouth Daily.    Anxiety  Medical condition is stable.  Continue same therapy.  Will recheck at next regular appointment           He is to remove arm dressing on Friday. Advised that he could come to allergy room to have removed as he would feel more comfortable having nursing remove than doing at home.          Follow Up     Return for Next scheduled follow up, Or sooner as needed.             "

## 2024-12-11 NOTE — ASSESSMENT & PLAN NOTE
BP with some elevation. Will increase Lisinopril dose to 30 mg daily. Let me know of concerns. Keep scheduled appt on 1/10/25 or be seen sooner as needed.     Orders:    lisinopril (PRINIVIL,ZESTRIL) 30 MG tablet; Take 1 tablet by mouth Daily.

## 2024-12-18 ENCOUNTER — LAB REQUISITION (OUTPATIENT)
Dept: LAB | Facility: HOSPITAL | Age: 44
End: 2024-12-18
Payer: COMMERCIAL

## 2024-12-18 DIAGNOSIS — S52.202K: ICD-10-CM

## 2024-12-18 DIAGNOSIS — S52.209M: ICD-10-CM

## 2024-12-18 PROCEDURE — 87205 SMEAR GRAM STAIN: CPT | Performed by: ORTHOPAEDIC SURGERY

## 2024-12-18 PROCEDURE — 87070 CULTURE OTHR SPECIMN AEROBIC: CPT | Performed by: ORTHOPAEDIC SURGERY

## 2024-12-20 LAB
BACTERIA SPEC AEROBE CULT: NORMAL
GRAM STN SPEC: NORMAL
GRAM STN SPEC: NORMAL

## 2025-01-10 ENCOUNTER — OFFICE VISIT (OUTPATIENT)
Dept: FAMILY MEDICINE CLINIC | Age: 45
End: 2025-01-10
Payer: COMMERCIAL

## 2025-01-10 VITALS
TEMPERATURE: 97.3 F | DIASTOLIC BLOOD PRESSURE: 90 MMHG | SYSTOLIC BLOOD PRESSURE: 151 MMHG | OXYGEN SATURATION: 96 % | BODY MASS INDEX: 28.17 KG/M2 | HEIGHT: 72 IN | HEART RATE: 68 BPM | WEIGHT: 208 LBS

## 2025-01-10 DIAGNOSIS — I10 ESSENTIAL HYPERTENSION: ICD-10-CM

## 2025-01-10 DIAGNOSIS — F41.9 ANXIETY: ICD-10-CM

## 2025-01-10 PROCEDURE — 99214 OFFICE O/P EST MOD 30 MIN: CPT | Performed by: NURSE PRACTITIONER

## 2025-01-10 RX ORDER — LISINOPRIL 30 MG/1
30 TABLET ORAL DAILY
Qty: 90 TABLET | Refills: 0 | Status: SHIPPED | OUTPATIENT
Start: 2025-01-10

## 2025-01-10 NOTE — PROGRESS NOTES
Chief Complaint  David Coles presents to [facility] for Hypertension    Subjective          History of Present Illness    David is following up on HTN today. Current medication is Lisinopril 30 mg daily. He has been compliant with medications. He has not been checking his BP at home. He reports that he has been feeling well.  He reports that he has been taking Dayquil and Nyquil recently for cold symptoms. Those have improved.  He is also taking buspirone for anxiety. He has been taking as needed. He feels helping. Typically takes 2 tablets.     Review of Systems      Allergies   Allergen Reactions    Cefepime Other (See Comments)      Past Medical History:   Diagnosis Date    Hypertension      Current Outpatient Medications   Medication Sig Dispense Refill    busPIRone (BUSPAR) 5 MG tablet Take 1 tablet by mouth 2 (Two) Times a Day As Needed (anxiety). 180 tablet 0    lisinopril (PRINIVIL,ZESTRIL) 30 MG tablet Take 1 tablet by mouth Daily. 90 tablet 0     No current facility-administered medications for this visit.     Past Surgical History:   Procedure Laterality Date    ORIF FOREARM FRACTURE        Social History     Tobacco Use    Smoking status: Former     Current packs/day: 0.00     Average packs/day: 1 pack/day for 29.0 years (29.0 ttl pk-yrs)     Types: Cigarettes     Start date:      Quit date:      Years since quittin.0     Passive exposure: Never    Smokeless tobacco: Never   Vaping Use    Vaping status: Never Used     Family History   Problem Relation Age of Onset    Diabetes Mother     Diabetes Maternal Grandmother      There are no preventive care reminders to display for this patient.   Immunization History   Administered Date(s) Administered    Td (TDVAX) 1996    Tdap 2024        Objective     Vitals:    01/10/25 1101 01/10/25 1130   BP: 151/96 151/90   Pulse: 74 68   Temp: 97.3 °F (36.3 °C)    TempSrc: Oral    SpO2: 96%    Weight: 94.3 kg  "(208 lb)    Height: 182.9 cm (72\")      Body mass index is 28.21 kg/m².                No results found.    Physical Exam  Vitals reviewed.   Constitutional:       General: He is not in acute distress.     Appearance: Normal appearance. He is well-developed.   HENT:      Head: Normocephalic and atraumatic.   Cardiovascular:      Rate and Rhythm: Normal rate and regular rhythm.   Pulmonary:      Effort: Pulmonary effort is normal.      Breath sounds: Normal breath sounds.   Neurological:      Mental Status: He is alert and oriented to person, place, and time.   Psychiatric:         Mood and Affect: Mood and affect normal.           Result Review :                               Assessment and Plan      Assessment & Plan  Essential hypertension    BP elevated. This may be due to recent OTC medication use. Will monitor BP at home 1-2 times per day and send me readings. Consider changing to Lisinopril/HCTZ 20/12.5 mg if remains elevated.   Orders:    lisinopril (PRINIVIL,ZESTRIL) 30 MG tablet; Take 1 tablet by mouth Daily.    Anxiety  Medical condition is stable.  Continue same therapy.  Will recheck at next regular appointment                   Follow Up     Return in about 8 weeks (around 3/7/2025) for Recheck.             "

## 2025-01-10 NOTE — ASSESSMENT & PLAN NOTE
BP elevated. This may be due to recent OTC medication use. Will monitor BP at home 1-2 times per day and send me readings. Consider changing to Lisinopril/HCTZ 20/12.5 mg if remains elevated.   Orders:    lisinopril (PRINIVIL,ZESTRIL) 30 MG tablet; Take 1 tablet by mouth Daily.

## 2025-01-14 ENCOUNTER — OFFICE VISIT (OUTPATIENT)
Dept: FAMILY MEDICINE CLINIC | Age: 45
End: 2025-01-14
Payer: COMMERCIAL

## 2025-01-14 VITALS
SYSTOLIC BLOOD PRESSURE: 128 MMHG | BODY MASS INDEX: 27.63 KG/M2 | WEIGHT: 204 LBS | TEMPERATURE: 98.6 F | HEART RATE: 80 BPM | DIASTOLIC BLOOD PRESSURE: 89 MMHG | HEIGHT: 72 IN | OXYGEN SATURATION: 98 %

## 2025-01-14 DIAGNOSIS — F41.9 ANXIETY: ICD-10-CM

## 2025-01-14 DIAGNOSIS — I10 ESSENTIAL HYPERTENSION: ICD-10-CM

## 2025-01-14 PROCEDURE — 99214 OFFICE O/P EST MOD 30 MIN: CPT | Performed by: NURSE PRACTITIONER

## 2025-01-14 RX ORDER — LISINOPRIL AND HYDROCHLOROTHIAZIDE 12.5; 2 MG/1; MG/1
1 TABLET ORAL DAILY
Qty: 90 TABLET | Refills: 0 | Status: SHIPPED | OUTPATIENT
Start: 2025-01-14

## 2025-01-14 NOTE — PROGRESS NOTES
Chief Complaint  David Coles presents to [facility] for Hypertension    Subjective          History of Present Illness    David is here today with concerns about his blood pressure. Current HTN medication is Lisinopril 30 mg daily. He reports BP checks yesterday were 180s on his machine at home. He has brought his machine to have calibrated. Has continued to take buspirone for anxiety which reports is working well.      Review of Systems      Allergies   Allergen Reactions    Cefepime Other (See Comments)      Past Medical History:   Diagnosis Date    Hypertension      Current Outpatient Medications   Medication Sig Dispense Refill    busPIRone (BUSPAR) 5 MG tablet Take 1 tablet by mouth 2 (Two) Times a Day As Needed (anxiety). 180 tablet 0    multivitamin with minerals (MULTIVITAMIN ADULT PO) Take 1 tablet by mouth Daily.      lisinopril-hydrochlorothiazide (PRINZIDE,ZESTORETIC) 20-12.5 MG per tablet Take 1 tablet by mouth Daily. 90 tablet 0     No current facility-administered medications for this visit.     Past Surgical History:   Procedure Laterality Date    ORIF FOREARM FRACTURE        Social History     Tobacco Use    Smoking status: Former     Current packs/day: 0.00     Average packs/day: 1 pack/day for 29.0 years (29.0 ttl pk-yrs)     Types: Cigarettes     Start date:      Quit date:      Years since quittin.0     Passive exposure: Never    Smokeless tobacco: Never   Vaping Use    Vaping status: Never Used     Family History   Problem Relation Age of Onset    Diabetes Mother     Diabetes Maternal Grandmother      There are no preventive care reminders to display for this patient.   Immunization History   Administered Date(s) Administered    Td (TDVAX) 1996    Tdap 2024        Objective     Vitals:    25 1251 25 1321   BP: 142/86 128/89   Pulse: 80    Temp: 98.6 °F (37 °C)    TempSrc: Oral    SpO2: 98%    Weight: 92.5 kg (204 lb)   "  Height: 182.9 cm (72.01\")      Body mass index is 27.66 kg/m².                No results found.    Physical Exam  Vitals reviewed.   Constitutional:       General: He is not in acute distress.     Appearance: Normal appearance. He is well-developed.   HENT:      Head: Normocephalic and atraumatic.   Cardiovascular:      Rate and Rhythm: Normal rate and regular rhythm.   Pulmonary:      Effort: Pulmonary effort is normal.      Breath sounds: Normal breath sounds.   Neurological:      Mental Status: He is alert and oriented to person, place, and time.   Psychiatric:         Mood and Affect: Mood and affect normal.           Result Review :                               Assessment and Plan      Assessment & Plan  Essential hypertension    Will change BP medication from Lisinopril 30 mg daily to Lisinopril/HCTZ 20/12.5 mg daily. Will let me know of concerns. He will come in to allergy room for BP check next week. He does report some increased anxiety associated with his machine as that is the same machine that [facility] was using when he was on IV antibiotics for osteomyelitis. He will stop checking regularly at home.        Anxiety  Reports stable on current treatment regimen.                  Follow Up     Return for Next scheduled follow up, Or sooner as needed.             "

## 2025-01-14 NOTE — ASSESSMENT & PLAN NOTE
Will change BP medication from Lisinopril 30 mg daily to Lisinopril/HCTZ 20/12.5 mg daily. Will let me know of concerns. He will come in to allergy room for BP check next week. He does report some increased anxiety associated with his machine as that is the same machine that [facility] was using when he was on IV antibiotics for osteomyelitis. He will stop checking regularly at home.

## 2025-03-18 RX ORDER — BUSPIRONE HYDROCHLORIDE 5 MG/1
5 TABLET ORAL 2 TIMES DAILY PRN
Qty: 180 TABLET | Refills: 0 | Status: SHIPPED | OUTPATIENT
Start: 2025-03-18

## 2025-04-22 RX ORDER — LISINOPRIL AND HYDROCHLOROTHIAZIDE 12.5; 2 MG/1; MG/1
1 TABLET ORAL DAILY
Qty: 90 TABLET | Refills: 0 | Status: SHIPPED | OUTPATIENT
Start: 2025-04-22

## 2025-04-28 ENCOUNTER — OFFICE VISIT (OUTPATIENT)
Dept: FAMILY MEDICINE CLINIC | Age: 45
End: 2025-04-28
Payer: COMMERCIAL

## 2025-04-28 ENCOUNTER — LAB (OUTPATIENT)
Dept: LAB | Facility: HOSPITAL | Age: 45
End: 2025-04-28
Payer: COMMERCIAL

## 2025-04-28 VITALS
BODY MASS INDEX: 28.12 KG/M2 | HEIGHT: 72 IN | WEIGHT: 207.6 LBS | SYSTOLIC BLOOD PRESSURE: 130 MMHG | HEART RATE: 84 BPM | OXYGEN SATURATION: 96 % | DIASTOLIC BLOOD PRESSURE: 86 MMHG | TEMPERATURE: 98 F

## 2025-04-28 DIAGNOSIS — R29.810 FACIAL DROOP: ICD-10-CM

## 2025-04-28 DIAGNOSIS — R20.0 NUMBNESS: Primary | ICD-10-CM

## 2025-04-28 DIAGNOSIS — R20.0 NUMBNESS: ICD-10-CM

## 2025-04-28 DIAGNOSIS — I10 ESSENTIAL HYPERTENSION: ICD-10-CM

## 2025-04-28 LAB
BASOPHILS # BLD AUTO: 0.02 10*3/MM3 (ref 0–0.2)
BASOPHILS NFR BLD AUTO: 0.3 % (ref 0–1.5)
DEPRECATED RDW RBC AUTO: 36.9 FL (ref 37–54)
EOSINOPHIL # BLD AUTO: 0.05 10*3/MM3 (ref 0–0.4)
EOSINOPHIL NFR BLD AUTO: 0.7 % (ref 0.3–6.2)
ERYTHROCYTE [DISTWIDTH] IN BLOOD BY AUTOMATED COUNT: 11.7 % (ref 12.3–15.4)
ERYTHROCYTE [SEDIMENTATION RATE] IN BLOOD: 22 MM/HR (ref 0–15)
HCT VFR BLD AUTO: 44 % (ref 37.5–51)
HGB BLD-MCNC: 14.7 G/DL (ref 13–17.7)
IMM GRANULOCYTES # BLD AUTO: 0.01 10*3/MM3 (ref 0–0.05)
IMM GRANULOCYTES NFR BLD AUTO: 0.1 % (ref 0–0.5)
LYMPHOCYTES # BLD AUTO: 3.14 10*3/MM3 (ref 0.7–3.1)
LYMPHOCYTES NFR BLD AUTO: 44 % (ref 19.6–45.3)
MCH RBC QN AUTO: 28.5 PG (ref 26.6–33)
MCHC RBC AUTO-ENTMCNC: 33.4 G/DL (ref 31.5–35.7)
MCV RBC AUTO: 85.4 FL (ref 79–97)
MONOCYTES # BLD AUTO: 0.33 10*3/MM3 (ref 0.1–0.9)
MONOCYTES NFR BLD AUTO: 4.6 % (ref 5–12)
NEUTROPHILS NFR BLD AUTO: 3.58 10*3/MM3 (ref 1.7–7)
NEUTROPHILS NFR BLD AUTO: 50.3 % (ref 42.7–76)
PLATELET # BLD AUTO: 274 10*3/MM3 (ref 140–450)
PMV BLD AUTO: 10.2 FL (ref 6–12)
RBC # BLD AUTO: 5.15 10*6/MM3 (ref 4.14–5.8)
WBC NRBC COR # BLD AUTO: 7.13 10*3/MM3 (ref 3.4–10.8)

## 2025-04-28 PROCEDURE — 87484 EHRLICHA CHAFFEENSIS AMP PRB: CPT | Performed by: NURSE PRACTITIONER

## 2025-04-28 PROCEDURE — 80053 COMPREHEN METABOLIC PANEL: CPT

## 2025-04-28 PROCEDURE — 99214 OFFICE O/P EST MOD 30 MIN: CPT | Performed by: NURSE PRACTITIONER

## 2025-04-28 PROCEDURE — 85652 RBC SED RATE AUTOMATED: CPT

## 2025-04-28 PROCEDURE — 36415 COLL VENOUS BLD VENIPUNCTURE: CPT

## 2025-04-28 PROCEDURE — 85025 COMPLETE CBC W/AUTO DIFF WBC: CPT

## 2025-04-28 PROCEDURE — 87468 ANAPLSMA PHGCYTOPHLM AMP PRB: CPT | Performed by: NURSE PRACTITIONER

## 2025-04-28 RX ORDER — ACYCLOVIR 400 MG/1
400 TABLET ORAL
COMMUNITY
Start: 2025-04-27

## 2025-04-28 RX ORDER — METHYLPREDNISOLONE 4 MG/1
4 TABLET ORAL DAILY
COMMUNITY
Start: 2025-04-27
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
## 2025-04-28 NOTE — ASSESSMENT & PLAN NOTE
Send to the lab, go ahead and start on the steroid that was send to his pharmacy that he has not picked up yet, if symptoms worsen or change go to ER, discussed neurology consult, possible dg testing   He will patch his right eye at HS, continue using drops to keep his eye moist

## 2025-04-29 ENCOUNTER — LAB (OUTPATIENT)
Dept: LAB | Facility: HOSPITAL | Age: 45
End: 2025-04-29
Payer: COMMERCIAL

## 2025-04-29 DIAGNOSIS — R70.0 ELEVATED SED RATE: ICD-10-CM

## 2025-04-29 DIAGNOSIS — R20.0 NUMBNESS: Primary | ICD-10-CM

## 2025-04-29 DIAGNOSIS — R29.810 FACIAL DROOP: Primary | ICD-10-CM

## 2025-04-29 DIAGNOSIS — R20.0 NUMBNESS: ICD-10-CM

## 2025-04-29 DIAGNOSIS — R29.810 FACIAL DROOP: ICD-10-CM

## 2025-04-29 LAB
ALBUMIN SERPL-MCNC: 4.9 G/DL (ref 3.5–5.2)
ALBUMIN/GLOB SERPL: 1.6 G/DL
ALP SERPL-CCNC: 80 U/L (ref 39–117)
ALT SERPL W P-5'-P-CCNC: 24 U/L (ref 1–41)
ANION GAP SERPL CALCULATED.3IONS-SCNC: 15 MMOL/L (ref 5–15)
AST SERPL-CCNC: 20 U/L (ref 1–40)
BILIRUB SERPL-MCNC: 0.6 MG/DL (ref 0–1.2)
BUN SERPL-MCNC: 17 MG/DL (ref 6–20)
BUN/CREAT SERPL: 17.2 (ref 7–25)
CALCIUM SPEC-SCNC: 9.9 MG/DL (ref 8.6–10.5)
CHLORIDE SERPL-SCNC: 97 MMOL/L (ref 98–107)
CO2 SERPL-SCNC: 26 MMOL/L (ref 22–29)
CREAT SERPL-MCNC: 0.99 MG/DL (ref 0.76–1.27)
EGFRCR SERPLBLD CKD-EPI 2021: 96.3 ML/MIN/1.73
GLOBULIN UR ELPH-MCNC: 3 GM/DL
GLUCOSE SERPL-MCNC: 119 MG/DL (ref 65–99)
POTASSIUM SERPL-SCNC: 4 MMOL/L (ref 3.5–5.2)
PROT SERPL-MCNC: 7.9 G/DL (ref 6–8.5)
SODIUM SERPL-SCNC: 138 MMOL/L (ref 136–145)

## 2025-04-29 PROCEDURE — 87798 DETECT AGENT NOS DNA AMP: CPT | Performed by: NURSE PRACTITIONER

## 2025-04-29 PROCEDURE — 86618 LYME DISEASE ANTIBODY: CPT | Performed by: NURSE PRACTITIONER

## 2025-04-29 PROCEDURE — 36415 COLL VENOUS BLD VENIPUNCTURE: CPT | Performed by: NURSE PRACTITIONER

## 2025-05-01 LAB — B BURGDOR IGG+IGM SER QL IA: NEGATIVE

## 2025-05-04 LAB — RICKETTSIA RICKETTSII DNA, RT: NOT DETECTED

## 2025-05-05 LAB
A PHAGOCYTOPH DNA BLD QL NAA+PROBE: NEGATIVE
EHRLICHIA DNA SPEC QL NAA+PROBE: NEGATIVE

## 2025-05-14 ENCOUNTER — TELEPHONE (OUTPATIENT)
Dept: FAMILY MEDICINE CLINIC | Age: 45
End: 2025-05-14
Payer: COMMERCIAL

## 2025-05-14 NOTE — TELEPHONE ENCOUNTER
Pt stated that he already completed these labs and Kiley's nurse has already called him back with results. Are you all wanting him to repeat those same labs?

## 2025-05-15 ENCOUNTER — TELEPHONE (OUTPATIENT)
Dept: FAMILY MEDICINE CLINIC | Age: 45
End: 2025-05-15
Payer: COMMERCIAL

## 2025-05-15 NOTE — TELEPHONE ENCOUNTER
It appears patient has duplicate labs in chart ordered 4/29/25. These were ordered again by Vazquez, and completed on 4/29/25. Okay to cancel duplicates ordered by Kiley?

## 2025-06-18 RX ORDER — BUSPIRONE HYDROCHLORIDE 5 MG/1
5 TABLET ORAL 2 TIMES DAILY PRN
Qty: 180 TABLET | Refills: 0 | Status: SHIPPED | OUTPATIENT
Start: 2025-06-18

## 2025-07-21 RX ORDER — LISINOPRIL AND HYDROCHLOROTHIAZIDE 12.5; 2 MG/1; MG/1
1 TABLET ORAL DAILY
Qty: 90 TABLET | Refills: 0 | Status: SHIPPED | OUTPATIENT
Start: 2025-07-21